# Patient Record
Sex: FEMALE | Race: WHITE | NOT HISPANIC OR LATINO | Employment: FULL TIME
[De-identification: names, ages, dates, MRNs, and addresses within clinical notes are randomized per-mention and may not be internally consistent; named-entity substitution may affect disease eponyms.]

---

## 2017-08-05 ENCOUNTER — HEALTH MAINTENANCE LETTER (OUTPATIENT)
Age: 27
End: 2017-08-05

## 2018-01-26 ENCOUNTER — OFFICE VISIT (OUTPATIENT)
Dept: FAMILY MEDICINE | Facility: CLINIC | Age: 28
End: 2018-01-26
Payer: COMMERCIAL

## 2018-01-26 VITALS
HEART RATE: 72 BPM | BODY MASS INDEX: 29.54 KG/M2 | WEIGHT: 166.7 LBS | SYSTOLIC BLOOD PRESSURE: 123 MMHG | DIASTOLIC BLOOD PRESSURE: 76 MMHG | OXYGEN SATURATION: 96 % | HEIGHT: 63 IN | TEMPERATURE: 98.3 F

## 2018-01-26 DIAGNOSIS — Z23 NEED FOR VACCINATION: ICD-10-CM

## 2018-01-26 DIAGNOSIS — E66.3 OVER WEIGHT: ICD-10-CM

## 2018-01-26 DIAGNOSIS — J45.20 ASTHMA, WELL CONTROLLED, MILD INTERMITTENT: ICD-10-CM

## 2018-01-26 DIAGNOSIS — Z11.3 SCREEN FOR STD (SEXUALLY TRANSMITTED DISEASE): ICD-10-CM

## 2018-01-26 DIAGNOSIS — Z12.4 SCREENING FOR CERVICAL CANCER: ICD-10-CM

## 2018-01-26 DIAGNOSIS — Z97.5 IUD (INTRAUTERINE DEVICE) IN PLACE: ICD-10-CM

## 2018-01-26 DIAGNOSIS — Z00.00 ROUTINE GENERAL MEDICAL EXAMINATION AT A HEALTH CARE FACILITY: Primary | ICD-10-CM

## 2018-01-26 PROBLEM — J45.30 MILD PERSISTENT ASTHMA, WELL CONTROLLED: Status: ACTIVE | Noted: 2018-01-26

## 2018-01-26 PROCEDURE — 87491 CHLMYD TRACH DNA AMP PROBE: CPT | Performed by: FAMILY MEDICINE

## 2018-01-26 PROCEDURE — 99385 PREV VISIT NEW AGE 18-39: CPT | Mod: 25 | Performed by: FAMILY MEDICINE

## 2018-01-26 PROCEDURE — 87389 HIV-1 AG W/HIV-1&-2 AB AG IA: CPT | Performed by: FAMILY MEDICINE

## 2018-01-26 PROCEDURE — 86780 TREPONEMA PALLIDUM: CPT | Performed by: FAMILY MEDICINE

## 2018-01-26 PROCEDURE — 90471 IMMUNIZATION ADMIN: CPT | Performed by: FAMILY MEDICINE

## 2018-01-26 PROCEDURE — G0145 SCR C/V CYTO,THINLAYER,RESCR: HCPCS | Performed by: FAMILY MEDICINE

## 2018-01-26 PROCEDURE — 36415 COLL VENOUS BLD VENIPUNCTURE: CPT | Performed by: FAMILY MEDICINE

## 2018-01-26 PROCEDURE — 90686 IIV4 VACC NO PRSV 0.5 ML IM: CPT | Performed by: FAMILY MEDICINE

## 2018-01-26 PROCEDURE — 87591 N.GONORRHOEAE DNA AMP PROB: CPT | Performed by: FAMILY MEDICINE

## 2018-01-26 RX ORDER — CETIRIZINE HYDROCHLORIDE 5 MG/1
TABLET, CHEWABLE ORAL
COMMUNITY
Start: 2010-05-08 | End: 2018-01-26

## 2018-01-26 RX ORDER — SPIRONOLACTONE 25 MG/1
25 TABLET ORAL
COMMUNITY
End: 2023-07-09

## 2018-01-26 RX ORDER — CETIRIZINE HYDROCHLORIDE 10 MG/1
10 TABLET ORAL
COMMUNITY
Start: 2015-12-22 | End: 2023-07-09

## 2018-01-26 RX ORDER — ALBUTEROL SULFATE 90 UG/1
1-2 AEROSOL, METERED RESPIRATORY (INHALATION) EVERY 6 HOURS PRN
COMMUNITY
Start: 2010-05-08

## 2018-01-26 NOTE — LETTER
My Asthma Action Plan  Name: Regina Lawson   YOB: 1990  Date: 1/26/2018   My doctor: Opal Gardner MD   My clinic: [facility]        My Control Medicine: None  My Rescue Medicine: Albuterol (Proair/Ventolin/Proventil) inhaler as needed   My Asthma Severity: intermittent  Avoid your asthma triggers: pollens, animal dander, humidity, exercise or sports and cold air               GREEN ZONE   Good Control    I feel good    No cough or wheeze    Can work, sleep and play without asthma symptoms       Take your asthma control medicine every day.     1. If exercise triggers your asthma, take your rescue medication    15 minutes before exercise or sports, and    During exercise if you have asthma symptoms  2. Spacer to use with inhaler: If you have a spacer, make sure to use it with your inhaler             YELLOW ZONE Getting Worse  I have ANY of these:    I do not feel good    Cough or wheeze    Chest feels tight    Wake up at night   1. Keep taking your Green Zone medications  2. Start taking your rescue medicine:    every 20 minutes for up to 1 hour. Then every 4 hours for 24-48 hours.  3. If you stay in the Yellow Zone for more than 12-24 hours, contact your doctor.  4. If you do not return to the Green Zone in 12-24 hours or you get worse, start taking your oral steroid medicine if prescribed by your provider.           RED ZONE Medical Alert - Get Help  I have ANY of these:    I feel awful    Medicine is not helping    Breathing getting harder    Trouble walking or talking    Nose opens wide to breathe       1. Take your rescue medicine NOW  2. If your provider has prescribed an oral steroid medicine, start taking it NOW  3. Call your doctor NOW  4. If you are still in the Red Zone after 20 minutes and you have not reached your doctor:    Take your rescue medicine again and    Call 911 or go to the emergency room right away    See your regular doctor within 2 weeks of an  Emergency Room or Urgent Care visit for follow-up treatment.        Electronically signed by: Opal Gardner, January 26, 2018    Annual Reminders:  Meet with Asthma Educator,  Flu Shot in the Fall, consider Pneumonia Vaccination for patients with asthma (aged 19 and older).    Pharmacy:    [id number] - [place], [place] - [address] AT [facility] [address] [place] [address]  [facility] [place] - [address] [facility]  [id number] [place], [place] - [address] [facility]                    Asthma Triggers  How To Control Things That Make Your Asthma Worse    Triggers are things that make your asthma worse.  Look at the list below to help you find your triggers and what you can do about them.  You can help prevent asthma flare-ups by staying away from your triggers.      Trigger                                                          What you can do   Cigarette Smoke  Tobacco smoke can make asthma worse. Do not allow smoking in your home, car or around you.  Be sure no one smokes at a child s day care or school.  If you smoke, ask your health care provider for ways to help you quit.  Ask family members to quit too.  Ask your health care provider for a referral to Quit Plan to help you quit smoking, or call 5-853-649-PLAN.     Colds, Flu, Bronchitis  These are common triggers of asthma. Wash your hands often.  Don t touch your eyes, nose or mouth.  Get a flu shot every year.     Dust Mites  These are tiny bugs that live in cloth or carpet. They are too small to see. Wash sheets and blankets in hot water every week.   Encase pillows and mattress in dust mite proof covers.  Avoid having carpet if you can. If you have carpet, vacuum weekly.   Use a dust mask and HEPA vacuum.   Pollen and Outdoor Mold  Some people are allergic to trees, grass, or weed pollen, or molds. Try to keep your windows closed.  Limit time out doors when  pollen count is high.   Ask you health care provider about taking medicine during allergy season.     Animal Dander  Some people are allergic to skin flakes, urine or saliva from pets with fur or feathers. Keep pets with fur or feathers out of your home.    If you can t keep the pet outdoors, then keep the pet out of your bedroom.  Keep the bedroom door closed.  Keep pets off cloth furniture and away from stuffed toys.     Mice, Rats, and Cockroaches  Some people are allergic to the waste from these pests.   Cover food and garbage.  Clean up spills and food crumbs.  Store grease in the refrigerator.   Keep food out of the bedroom.   Indoor Mold  This can be a trigger if your home has high moisture. Fix leaking faucets, pipes, or other sources of water.   Clean moldy surfaces.  Dehumidify basement if it is damp and smelly.   Smoke, Strong Odors, and Sprays  These can reduce air quality. Stay away from strong odors and sprays, such as perfume, powder, hair spray, paints, smoke incense, paint, cleaning products, candles and new carpet.   Exercise or Sports  Some people with asthma have this trigger. Be active!  Ask your doctor about taking medicine before sports or exercise to prevent symptoms.    Warm up for 5-10 minutes before and after sports or exercise.     Other Triggers of Asthma  Cold air:  Cover your nose and mouth with a scarf.  Sometimes laughing or crying can be a trigger.  Some medicines and food can trigger asthma.

## 2018-01-26 NOTE — NURSING NOTE
"Chief Complaint   Patient presents with     Physical       Initial /76  Pulse 72  Temp 98.3  F (36.8  C) (Oral)  Ht 5' 3\" (1.6 m)  Wt 166 lb 11.2 oz (75.6 kg)  LMP 12/28/2017 (Approximate)  SpO2 96%  BMI 29.53 kg/m2 Estimated body mass index is 29.53 kg/(m^2) as calculated from the following:    Height as of this encounter: 5' 3\" (1.6 m).    Weight as of this encounter: 166 lb 11.2 oz (75.6 kg).  Medication Reconciliation: complete      Health Maintenance that is potentially due pending provider review:  Pap Smear    Possibly completing today per provider review.    DARREN Beck  "

## 2018-01-26 NOTE — PATIENT INSTRUCTIONS

## 2018-01-26 NOTE — PROGRESS NOTES
SUBJECTIVE:   CC: Regina Lawson is an 27 year old woman who presents for preventive health visit.   History of excercise induced asthma  Albuterol helps as needed   Also has allergies- all yr round  Over the counter Cetirizine helps  Does not need refill on any medications  ACT Total Scores 1/26/2018   ACT TOTAL SCORE (Goal Greater than or Equal to 20) 25   In the past 12 months, how many times did you visit the emergency room for your asthma without being admitted to the hospital? 0   In the past 12 months, how many times were you hospitalized overnight because of your asthma? 0         She is interested in screening for Diabetes   Stressful Sedentary work at  [employer] job  Stress eat  Weight gain- secondary to that & also fracture arm this past summer July 2017    Physical   Annual:     Getting at least 3 servings of Calcium per day::  Yes    Bi-annual eye exam::  Yes    Dental care twice a year::  NO    Sleep apnea or symptoms of sleep apnea::  None    Diet::  Regular (no restrictions)    Frequency of exercise::  2-3 days/week    Duration of exercise::  15-30 minutes    Taking medications regularly::  Yes    Medication side effects::  Other    Additional concerns today::  YES                PROBLEMS TO ADD ON...    Today's PHQ-2 Score:   PHQ-2 ( 1999 Pfizer) 1/26/2018   Q1: Little interest or pleasure in doing things 1   Q2: Feeling down, depressed or hopeless 1   PHQ-2 Score 2   Q1: Little interest or pleasure in doing things Several days   Q2: Feeling down, depressed or hopeless Several days   PHQ-2 Score 2       Abuse: Current or Past(Physical, Sexual or Emotional)- No  Do you feel safe in your environment - Yes    Social History   Substance Use Topics     Smoking status: Never Smoker     Smokeless tobacco: Never Used     Alcohol use No     Alcohol Use 1/26/2018   If you drink alcohol, do you typically have greater than 3 drinks per day OR greater than 7 drinks per week?   No       Reviewed orders with  "patient.  Reviewed health maintenance and updated orders accordingly - Yes  Labs reviewed in EPIC    Mammogram not appropriate for this patient based on age.    Pertinent mammograms are reviewed under the imaging tab.  History of abnormal Pap smear: NO - age 21-29 PAP every 3 years recommended    Reviewed and updated as needed this visit by clinical staff  Tobacco  Allergies  Meds         Reviewed and updated as needed this visit by Provider            Review of Systems  C: NEGATIVE for fever, chills, change in weight  I: NEGATIVE for worrisome rashes, moles or lesions  E: NEGATIVE for vision changes or irritation  ENT: NEGATIVE for ear, mouth and throat problems  R: NEGATIVE for significant cough or SOB  B: NEGATIVE for masses, tenderness or discharge  CV: NEGATIVE for chest pain, palpitations or peripheral edema  GI: NEGATIVE for nausea, abdominal pain, heartburn, or change in bowel habits  : NEGATIVE for unusual urinary or vaginal symptoms. Periods are regular.  M: NEGATIVE for significant arthralgias or myalgia  N: NEGATIVE for weakness, dizziness or paresthesias  P: NEGATIVE for changes in mood or affect     OBJECTIVE:   /76  Pulse 72  Temp 98.3  F (36.8  C) (Oral)  Ht 5' 3\" (1.6 m)  Wt 166 lb 11.2 oz (75.6 kg)  LMP 12/28/2017 (Approximate)  SpO2 96%  BMI 29.53 kg/m2  Physical Exam  GENERAL: healthy, alert and no distress  EYES: Eyes grossly normal to inspection, PERRL and conjunctivae and sclerae normal  HENT: ear canals and TM's normal, nose and mouth without ulcers or lesions  NECK: no adenopathy, no asymmetry, masses, or scars and thyroid normal to palpation  RESP: lungs clear to auscultation - no rales, rhonchi or wheezes  BREAST: normal without masses, tenderness or nipple discharge and no palpable axillary masses or adenopathy  CV: regular rate and rhythm, normal S1 S2, no S3 or S4, no murmur, click or rub, no peripheral edema and peripheral pulses strong  ABDOMEN: soft, nontender, no " "hepatosplenomegaly, no masses and bowel sounds normal   (female): normal female external genitalia, normal urethral meatus, vaginal mucosa pink, moist, well rugated, and normal cervix/adnexa/uterus without masses or discharge  MS: no gross musculoskeletal defects noted, no edema  SKIN: no suspicious lesions or rashes  NEURO: Normal strength and tone, mentation intact and speech normal  PSYCH: mentation appears normal, affect normal/bright    ASSESSMENT/PLAN:   (Z00.00) Routine general medical examination at a health care facility  (primary encounter diagnosis)  Comment: Plan: Please see patient instructions     (J45.30) Mild intermitent asthma, well controlled  Comment: Plan: proair as needed  . Will call if needs refill    (E66.3) Over weight  Comment: Plan: enocuarged portion and calorie control  Adding fresh veggies to the diet  Has plans for weekly meal planning at home  Has noted already some intentional weight loss     (Z12.4) Screening for cervical cancer  Comment: tami: Pap imaged thin layer screen reflex to HPV if         ASCUS - recommend age 25 - 29          (Z97.5) IUD (intrauterine device) in place  Comment: Jasmin-- need removal 12/31/2018  Plan:     (Z23) Need for vaccination  Comment: Plan: HC FLU VAC PRESRV FREE QUAD SPLIT VIR 3+YRS IM          (Z11.3) Screen for STD (sexually transmitted disease)  Comment: Plan: Chlamydia trachomatis PCR, Neisseria         gonorrhoeae PCR              COUNSELING:  Reviewed preventive health counseling, as reflected in patient instructions       Regular exercise       Healthy diet/nutrition         reports that she has never smoked. She has never used smokeless tobacco.    Estimated body mass index is 29.53 kg/(m^2) as calculated from the following:    Height as of this encounter: 5' 3\" (1.6 m).    Weight as of this encounter: 166 lb 11.2 oz (75.6 kg).       Counseling Resources:  ATP IV Guidelines  Pooled Cohorts Equation Calculator  Breast Cancer Risk " Calculator  FRAX Risk Assessment  ICSI Preventive Guidelines  Dietary Guidelines for Americans, 2010  USDA's MyPlate  ASA Prophylaxis  Lung CA Screening    Opal Gardner MD  [facility]  Answers for HPI/ROS submitted by the patient on 1/26/2018   PHQ-2 Score: 2

## 2018-01-26 NOTE — LETTER
February 2, 2018      Regina Renny  [address] [facility] [address]    Dear ,      I am happy to inform you that your recent cervical cancer screening test (PAP smear) was normal.      Preventative screenings such as this help to ensure your health for years to come. You should repeat a pap smear in 3 years, unless otherwise directed.      You will still need to return to the clinic every year for your annual exam and other preventive tests.     Please contact the clinic at [phone] if you have further questions.       Sincerely,      Opal Gardner MD/[facility]

## 2018-01-26 NOTE — MR AVS SNAPSHOT
After Visit Summary   1/26/2018    Regina Lawson    MRN: [id number]           Patient Information     Date Of Birth          1990        Visit Information        Provider Department      1/26/2018 12:00 PM Opal Gardner MD [facility]        Today's Diagnoses     Routine general medical examination at a health care facility    -  1    Mild persistent asthma, well controlled        Over weight        Screening for cervical cancer        IUD (intrauterine device) in place        Need for vaccination        Screen for STD (sexually transmitted disease)          Care Instructions      Preventive Health Recommendations  Female Ages 26 - 39  Yearly exam:   See your health care provider every year in order to    Review health changes.     Discuss preventive care.      Review your medicines if you your doctor has prescribed any.    Until age 30: Get a Pap test every three years (more often if you have had an abnormal result).    After age 30: Talk to your doctor about whether you should have a Pap test every 3 years or have a Pap test with HPV screening every 5 years.   You do not need a Pap test if your uterus was removed (hysterectomy) and you have not had cancer.  You should be tested each year for STDs (sexually transmitted diseases), if you're at risk.   Talk to your provider about how often to have your cholesterol checked.  If you are at risk for diabetes, you should have a diabetes test (fasting glucose).  Shots: Get a flu shot each year. Get a tetanus shot every 10 years.   Nutrition:     Eat at least 5 servings of fruits and vegetables each day.    Eat whole-grain bread, whole-wheat pasta and brown rice instead of white grains and rice.    Talk to your provider about Calcium and Vitamin D.     Lifestyle    Exercise at least 150 minutes a week (30 minutes a day, 5 days of the week). This will help you control your weight and prevent disease.    Limit alcohol to one drink  per day.    No smoking.     Wear sunscreen to prevent skin cancer.    See your dentist every six months for an exam and cleaning.      Preventive Health Recommendations  Female Ages 26 - 39  Yearly exam:   See your health care provider every year in order to    Review health changes.     Discuss preventive care.      Review your medicines if you your doctor has prescribed any.    Until age 30: Get a Pap test every three years (more often if you have had an abnormal result).    After age 30: Talk to your doctor about whether you should have a Pap test every 3 years or have a Pap test with HPV screening every 5 years.   You do not need a Pap test if your uterus was removed (hysterectomy) and you have not had cancer.  You should be tested each year for STDs (sexually transmitted diseases), if you're at risk.   Talk to your provider about how often to have your cholesterol checked.  If you are at risk for diabetes, you should have a diabetes test (fasting glucose).  Shots: Get a flu shot each year. Get a tetanus shot every 10 years.   Nutrition:     Eat at least 5 servings of fruits and vegetables each day.    Eat whole-grain bread, whole-wheat pasta and brown rice instead of white grains and rice.    Talk to your provider about Calcium and Vitamin D.     Lifestyle    Exercise at least 150 minutes a week (30 minutes a day, 5 days of the week). This will help you control your weight and prevent disease.    Limit alcohol to one drink per day.    No smoking.     Wear sunscreen to prevent skin cancer.    See your dentist every six months for an exam and cleaning.            Follow-ups after your visit        Who to contact     If you have questions or need follow up information about today's clinic visit or your schedule please contact [facility] directly at [phone].  Normal or non-critical lab and imaging results will be communicated to you by MyChart, letter or phone within 4 business days after the  "clinic has received the results. If you do not hear from us within 7 days, please contact the clinic through [employer] or phone. If you have a critical or abnormal lab result, we will notify you by phone as soon as possible.  Submit refill requests through [employer] or call your pharmacy and they will forward the refill request to us. Please allow 3 business days for your refill to be completed.          Additional Information About Your Visit        [employer] Information     [employer] lets you send messages to your doctor, view your test results, renew your prescriptions, schedule appointments and more. To sign up, go to [email or web address] . Click on \"Log in\" on the left side of the screen, which will take you to the Welcome page. Then click on \"Sign up Now\" on the right side of the page.     You will be asked to enter the access code listed below, as well as some personal information. Please follow the directions to create your username and password.     Your access code is: [email or web address]  Expires: 2018 12:52 PM     Your access code will  in 90 days. If you need help or a new code, please call your [facility] clinic or [phone].        Care EveryWhere ID     This is your Care EveryWhere ID. This could be used by other organizations to access your [facility] medical records  [id number]        Your Vitals Were     Pulse Temperature Height Last Period Pulse Oximetry BMI (Body Mass Index)    72 98.3  F (36.8  C) (Oral) 5' 3\" (1.6 m) 2017 (Approximate) 96% 29.53 kg/m2       Blood Pressure from Last 3 Encounters:   18 123/76   14 112/75   11 104/65    Weight from Last 3 Encounters:   18 166 lb 11.2 oz (75.6 kg)   14 160 lb (72.6 kg)   11 147 lb 6.4 oz (66.9 kg)              We Performed the Following     Anti Treponema     Chlamydia trachomatis PCR     HC FLU VAC PRESRV FREE QUAD SPLIT VIR 3+YRS IM     HIV Antigen Antibody Combo     Neisseria gonorrhoeae PCR     Pap " imaged thin layer screen reflex to HPV if ASCUS - recommend age 25 - 29        Primary Care Provider Office Phone # Fax #    Opal Gardner [phone] [address] [facility] [address]        Equal Access to Services     DANIEL ECHOLS : Hadii radha goldman hadrondao Soomaali, waaxda luqadaha, qaybta kaalmada adeegyada, liz ford rebeccabeck fayesedamilton sebastian. So [facility] [phone].    ATENCIÓN: Si habla español, tiene a jacobson disposición servicios gratuitos de asistencia lingüística. Llame al [phone].    We comply with applicable federal civil rights laws and [place] laws. We do not discriminate on the basis of race, color, national origin, age, disability, sex, sexual orientation, or gender identity.            Thank you!     Thank you for choosing [facility]  for your care. Our goal is always to provide you with excellent care. Hearing back from our patients is one way we can continue to improve our services. Please take a few minutes to complete the written survey that you may receive in the mail after your visit with us. Thank you!             Your Updated Medication List - Protect others around you: Learn how to safely use, store and throw away your medicines at www.disposemymeds.org.          This list is accurate as of 1/26/18 12:52 PM.  Always use your most recent med list.                   Brand Name Dispense Instructions for use Diagnosis    albuterol 108 (90 BASE) MCG/ACT Inhaler    PROAIR HFA/PROVENTIL HFA/VENTOLIN HFA     1-2 puffs        cetirizine 10 MG tablet    zyrTEC     Take 10 mg by mouth        CLARITIN 10 MG tablet   Generic drug:  loratadine     30 tablet    Take 1 tablet (10 mg) by mouth daily    Perennial allergic rhinitis       etonogestrel-ethinyl estradiol 0.12-0.015 MG/24HR vaginal ring    NUVARING    3 each    Place 1 ring every 21 days then remove for 1 week.    Contraception       Levonorgestrel 13.5 MG Iud      1 Device by Intrauterine  route        norgestimate-ethinyl estradiol 0.25-35 MG-MCG per tablet    ORTHO-CYCLEN, SPRINTEC    3 Package    Take 1 tablet by mouth daily    Other acne, Contraception       spironolactone 25 MG tablet    ALDACTONE     Take 25 mg by mouth

## 2018-01-27 LAB — T PALLIDUM IGG+IGM SER QL: NEGATIVE

## 2018-01-27 ASSESSMENT — ASTHMA QUESTIONNAIRES: ACT_TOTALSCORE: 25

## 2018-01-28 PROBLEM — J45.20 ASTHMA, WELL CONTROLLED, MILD INTERMITTENT: Status: ACTIVE | Noted: 2018-01-26

## 2018-01-28 LAB
C TRACH DNA SPEC QL NAA+PROBE: NEGATIVE
N GONORRHOEA DNA SPEC QL NAA+PROBE: NEGATIVE
SPECIMEN SOURCE: NORMAL
SPECIMEN SOURCE: NORMAL

## 2018-01-29 LAB — HIV 1+2 AB+HIV1 P24 AG SERPL QL IA: NONREACTIVE

## 2018-01-29 NOTE — PROGRESS NOTES
Send lab & letter    All labs normal  Negative for chlamydia & gonorrhea , HIV, human immunodeficiency virus  and syphilis      Please keep us posted with questions or concerns .      Best Regards,    Opal Gardner MD  [facility]  [phone]

## 2018-01-30 LAB
COPATH REPORT: NORMAL
PAP: NORMAL

## 2020-07-31 ENCOUNTER — VIRTUAL VISIT (OUTPATIENT)
Dept: FAMILY MEDICINE | Facility: OTHER | Age: 30
End: 2020-07-31

## 2020-07-31 NOTE — PROGRESS NOTES
"Date: 2020 12:25:17  Clinician: Alison Frias  Clinician NPI: [id number]  Patient: Regina Lawson  Patient : 1990  Patient Address: [address] [place] [address]  Patient Phone: [phone]  Visit Protocol: URI  Patient Summary:  Regina is a 30 year old ( : 1990 ) female who initiated a Visit for COVID-19 (Coronavirus) evaluation and screening. When asked the question \"Please sign me up to receive news, health information and promotions. \", Regina responded \"No\".    When asked when her symptoms started, Regina reported that she does not have any symptoms.   She denies having recent facial or sinus surgery in the past 60 days and taking antibiotic medication in the past month.    Pertinent COVID-19 (Coronavirus) information  In the past 14 days, Regina has not worked in a congregate living setting.   She does not work or volunteer as healthcare worker or a  and does not work or volunteer in a healthcare facility.   Regina also has not lived in a congregate living setting in the past 14 days. She does not live with a healthcare worker.   Regina has not had a close contact with a laboratory-confirmed COVID-19 patient within the last 14 days.   Pertinent medical history  Regina does not get yeast infections when she takes antibiotics.   Regina does not need a return to work/school note.   Weight: 160 lbs   Regina does not smoke or use smokeless tobacco.   She denies pregnancy and denies breastfeeding. She has menstruated in the past month.   Weight: 160 lbs    MEDICATIONS: Jasmin intrauterine, ALLERGIES: NKDA  Clinician Response:  Dear Regina,   Based on the details you've shared, you do not need to be tested at this time.  What should I do?  Cover your mouth and nose with a mask, tissue or washcloth to avoid spreading germs.  Wash your hands and face often. Use soap and water.  What are the symptoms of COVID-19?  The most common symptoms " are cough, fever and trouble breathing. Less common symptoms include headache, body aches, fatigue (feeling very tired), chills, sore throat, stuffy or runny nose, diarrhea (loose poop), loss of taste or smell, belly pain, and nausea or vomiting (feeling sick to your stomach or throwing up).  If you develop symptoms, follow these guidelines.  If you're normally healthy: Please start another [facility] visit to report your symptoms. Go to [email or web address].  If you have a serious health problem (like cancer, heart failure, an organ transplant or kidney disease): Call your specialty clinic. Let them know that you might have COVID-19.  Where can I get more information?    [facility] [facility] -- About COVID-19: [email or web address]  CDC -- What to Do If You're Sick: www.cdc.gov/coronavirus/2019-ncov/about/steps-when-sick.html  [facility] -- Ending Home Isolation: www.cdc.gov/coronavirus/2019-ncov/hcp/disposition-in-home-patients.html  CDC -- Caring for Someone: www.cdc.gov/coronavirus/2019-ncov/if-you-are-sick/care-for-someone.html  [facility] -- Interim Guidance for Hospital Discharge to Home: www.health.[facility].[place]/diseases/coronavirus/hcp/hospdischarge.pdf  [facility] clinical trials (COVID-19 research studies): clinicalaffairs.[facility]-clinical-trials  Below are the COVID-19 hotlines at the [facility] of Health ([facility]). Interpreters are available.   For health questions: Call 935-918-2721 or 1-753.441.7378 (7 a.m. to 7 p.m.) For questions about schools and childcare: Call 204-279-7458 or 1-340.693.6212 (7 a.m. to 7 p.m.)     .      Diagnosis: Worries  Diagnosis ICD: R45.82

## 2023-07-08 ENCOUNTER — APPOINTMENT (OUTPATIENT)
Dept: CT IMAGING | Facility: CLINIC | Age: 33
DRG: 076 | End: 2023-07-08
Attending: EMERGENCY MEDICINE
Payer: COMMERCIAL

## 2023-07-08 ENCOUNTER — HOSPITAL ENCOUNTER (INPATIENT)
Facility: CLINIC | Age: 33
LOS: 2 days | Discharge: HOME | DRG: 076 | End: 2023-07-11
Attending: EMERGENCY MEDICINE | Admitting: INTERNAL MEDICINE
Payer: COMMERCIAL

## 2023-07-08 DIAGNOSIS — G03.0 ASEPTIC MENINGITIS: Primary | ICD-10-CM

## 2023-07-08 DIAGNOSIS — R11.2 NAUSEA AND VOMITING, UNSPECIFIED VOMITING TYPE: ICD-10-CM

## 2023-07-08 DIAGNOSIS — H46.9 OPTIC NEURITIS, LEFT: ICD-10-CM

## 2023-07-08 DIAGNOSIS — R51.9 NONINTRACTABLE HEADACHE, UNSPECIFIED CHRONICITY PATTERN, UNSPECIFIED HEADACHE TYPE: ICD-10-CM

## 2023-07-08 LAB
ANION GAP SERPL CALCULATED.3IONS-SCNC: 13 MMOL/L (ref 7–15)
BUN SERPL-MCNC: 6.1 MG/DL (ref 6–20)
CALCIUM SERPL-MCNC: 8.9 MG/DL (ref 8.6–10)
CHLORIDE SERPL-SCNC: 104 MMOL/L (ref 98–107)
CREAT SERPL-MCNC: 0.72 MG/DL (ref 0.51–0.95)
DEPRECATED HCO3 PLAS-SCNC: 22 MMOL/L (ref 22–29)
ERYTHROCYTE [DISTWIDTH] IN BLOOD BY AUTOMATED COUNT: 12.3 % (ref 10–15)
GFR SERPL CREATININE-BSD FRML MDRD: >90 ML/MIN/1.73M2
GLUCOSE SERPL-MCNC: 131 MG/DL (ref 70–99)
HCG SERPL QL: NEGATIVE
HCT VFR BLD AUTO: 40.6 % (ref 35–47)
HGB BLD-MCNC: 13.7 G/DL (ref 11.7–15.7)
MCH RBC QN AUTO: 30.2 PG (ref 26.5–33)
MCHC RBC AUTO-ENTMCNC: 33.7 G/DL (ref 31.5–36.5)
MCV RBC AUTO: 90 FL (ref 78–100)
PLATELET # BLD AUTO: 311 10E3/UL (ref 150–450)
POTASSIUM SERPL-SCNC: 3.8 MMOL/L (ref 3.4–5.3)
RBC # BLD AUTO: 4.53 10E6/UL (ref 3.8–5.2)
SARS-COV-2 RNA RESP QL NAA+PROBE: NEGATIVE
SODIUM SERPL-SCNC: 139 MMOL/L (ref 136–145)
WBC # BLD AUTO: 8.2 10E3/UL (ref 4–11)

## 2023-07-08 PROCEDURE — 36415 COLL VENOUS BLD VENIPUNCTURE: CPT | Performed by: EMERGENCY MEDICINE

## 2023-07-08 PROCEDURE — 96361 HYDRATE IV INFUSION ADD-ON: CPT

## 2023-07-08 PROCEDURE — 87635 SARS-COV-2 COVID-19 AMP PRB: CPT | Performed by: EMERGENCY MEDICINE

## 2023-07-08 PROCEDURE — 86140 C-REACTIVE PROTEIN: CPT | Performed by: PSYCHIATRY & NEUROLOGY

## 2023-07-08 PROCEDURE — 86038 ANTINUCLEAR ANTIBODIES: CPT | Performed by: PSYCHIATRY & NEUROLOGY

## 2023-07-08 PROCEDURE — 84703 CHORIONIC GONADOTROPIN ASSAY: CPT | Performed by: EMERGENCY MEDICINE

## 2023-07-08 PROCEDURE — 70498 CT ANGIOGRAPHY NECK: CPT

## 2023-07-08 PROCEDURE — 96374 THER/PROPH/DIAG INJ IV PUSH: CPT | Mod: 59

## 2023-07-08 PROCEDURE — 258N000003 HC RX IP 258 OP 636: Performed by: EMERGENCY MEDICINE

## 2023-07-08 PROCEDURE — 250N000011 HC RX IP 250 OP 636: Performed by: EMERGENCY MEDICINE

## 2023-07-08 PROCEDURE — 70496 CT ANGIOGRAPHY HEAD: CPT | Mod: XS

## 2023-07-08 PROCEDURE — 250N000011 HC RX IP 250 OP 636: Mod: JZ | Performed by: EMERGENCY MEDICINE

## 2023-07-08 PROCEDURE — 250N000009 HC RX 250: Performed by: EMERGENCY MEDICINE

## 2023-07-08 PROCEDURE — 70450 CT HEAD/BRAIN W/O DYE: CPT

## 2023-07-08 PROCEDURE — 80048 BASIC METABOLIC PNL TOTAL CA: CPT | Performed by: EMERGENCY MEDICINE

## 2023-07-08 PROCEDURE — 99285 EMERGENCY DEPT VISIT HI MDM: CPT | Mod: 25

## 2023-07-08 PROCEDURE — 009U3ZX DRAINAGE OF SPINAL CANAL, PERCUTANEOUS APPROACH, DIAGNOSTIC: ICD-10-PCS | Performed by: RADIOLOGY

## 2023-07-08 PROCEDURE — 96375 TX/PRO/DX INJ NEW DRUG ADDON: CPT

## 2023-07-08 PROCEDURE — 85027 COMPLETE CBC AUTOMATED: CPT | Performed by: EMERGENCY MEDICINE

## 2023-07-08 PROCEDURE — 70496 CT ANGIOGRAPHY HEAD: CPT

## 2023-07-08 RX ORDER — ONDANSETRON 2 MG/ML
4 INJECTION INTRAMUSCULAR; INTRAVENOUS ONCE
Status: COMPLETED | OUTPATIENT
Start: 2023-07-08 | End: 2023-07-08

## 2023-07-08 RX ORDER — DIPHENHYDRAMINE HYDROCHLORIDE 50 MG/ML
25 INJECTION INTRAMUSCULAR; INTRAVENOUS ONCE
Status: COMPLETED | OUTPATIENT
Start: 2023-07-08 | End: 2023-07-08

## 2023-07-08 RX ORDER — IOPAMIDOL 755 MG/ML
75 INJECTION, SOLUTION INTRAVASCULAR ONCE
Status: COMPLETED | OUTPATIENT
Start: 2023-07-08 | End: 2023-07-08

## 2023-07-08 RX ADMIN — ONDANSETRON 4 MG: 2 INJECTION INTRAMUSCULAR; INTRAVENOUS at 22:42

## 2023-07-08 RX ADMIN — SODIUM CHLORIDE 1000 ML: 9 INJECTION, SOLUTION INTRAVENOUS at 22:42

## 2023-07-08 RX ADMIN — DIPHENHYDRAMINE HYDROCHLORIDE 25 MG: 50 INJECTION, SOLUTION INTRAMUSCULAR; INTRAVENOUS at 23:02

## 2023-07-08 RX ADMIN — IOPAMIDOL 75 ML: 755 INJECTION, SOLUTION INTRAVENOUS at 23:40

## 2023-07-08 RX ADMIN — PROCHLORPERAZINE EDISYLATE 10 MG: 5 INJECTION INTRAMUSCULAR; INTRAVENOUS at 23:02

## 2023-07-08 RX ADMIN — SODIUM CHLORIDE 100 ML: 9 INJECTION, SOLUTION INTRAVENOUS at 23:40

## 2023-07-08 ASSESSMENT — ACTIVITIES OF DAILY LIVING (ADL): ADLS_ACUITY_SCORE: 35

## 2023-07-09 ENCOUNTER — APPOINTMENT (OUTPATIENT)
Dept: GENERAL RADIOLOGY | Facility: CLINIC | Age: 33
DRG: 076 | End: 2023-07-09
Attending: PSYCHIATRY & NEUROLOGY
Payer: COMMERCIAL

## 2023-07-09 ENCOUNTER — APPOINTMENT (OUTPATIENT)
Dept: MRI IMAGING | Facility: CLINIC | Age: 33
DRG: 076 | End: 2023-07-09
Attending: EMERGENCY MEDICINE
Payer: COMMERCIAL

## 2023-07-09 PROBLEM — R11.2 NAUSEA AND VOMITING, UNSPECIFIED VOMITING TYPE: Status: ACTIVE | Noted: 2023-07-09

## 2023-07-09 PROBLEM — H46.9 OPTIC NEURITIS, LEFT: Status: ACTIVE | Noted: 2023-07-09

## 2023-07-09 PROBLEM — R51.9 NONINTRACTABLE HEADACHE, UNSPECIFIED CHRONICITY PATTERN, UNSPECIFIED HEADACHE TYPE: Status: ACTIVE | Noted: 2023-07-09

## 2023-07-09 LAB
APPEARANCE CSF: CLEAR
COLOR CSF: COLORLESS
CRP SERPL-MCNC: 47.02 MG/L
ERYTHROCYTE [SEDIMENTATION RATE] IN BLOOD BY WESTERGREN METHOD: 7 MM/HR (ref 0–20)
GLUCOSE CSF-MCNC: 56 MG/DL (ref 40–70)
LYMPH ABN NFR CSF MANUAL: 53 %
MONOS+MACROS NFR CSF MANUAL: 46 %
NEUTROPHILS NFR CSF MANUAL: 1 %
PERFORMING LABORATORY: NORMAL
PROT CSF-MCNC: 57.3 MG/DL (ref 15–45)
RBC # CSF MANUAL: 13 /UL (ref 0–2)
TEST NAME: NORMAL
TUBE # CSF: 4
WBC # CSF MANUAL: 124 /UL (ref 0–5)

## 2023-07-09 PROCEDURE — 82945 GLUCOSE OTHER FLUID: CPT | Performed by: PSYCHIATRY & NEUROLOGY

## 2023-07-09 PROCEDURE — 87529 HSV DNA AMP PROBE: CPT | Performed by: PSYCHIATRY & NEUROLOGY

## 2023-07-09 PROCEDURE — 120N000001 HC R&B MED SURG/OB

## 2023-07-09 PROCEDURE — 87070 CULTURE OTHR SPECIMN AEROBIC: CPT | Performed by: PSYCHIATRY & NEUROLOGY

## 2023-07-09 PROCEDURE — 99207 PR NO CHARGE LOS: CPT | Performed by: INTERNAL MEDICINE

## 2023-07-09 PROCEDURE — 89051 BODY FLUID CELL COUNT: CPT | Performed by: PSYCHIATRY & NEUROLOGY

## 2023-07-09 PROCEDURE — 250N000013 HC RX MED GY IP 250 OP 250 PS 637: Performed by: INTERNAL MEDICINE

## 2023-07-09 PROCEDURE — 70543 MRI ORBT/FAC/NCK W/O &W/DYE: CPT

## 2023-07-09 PROCEDURE — 86592 SYPHILIS TEST NON-TREP QUAL: CPT | Performed by: PSYCHIATRY & NEUROLOGY

## 2023-07-09 PROCEDURE — 82164 ANGIOTENSIN I ENZYME TEST: CPT | Performed by: PSYCHIATRY & NEUROLOGY

## 2023-07-09 PROCEDURE — 96375 TX/PRO/DX INJ NEW DRUG ADDON: CPT

## 2023-07-09 PROCEDURE — A9585 GADOBUTROL INJECTION: HCPCS | Performed by: EMERGENCY MEDICINE

## 2023-07-09 PROCEDURE — 62328 DX LMBR SPI PNXR W/FLUOR/CT: CPT

## 2023-07-09 PROCEDURE — 84157 ASSAY OF PROTEIN OTHER: CPT | Performed by: PSYCHIATRY & NEUROLOGY

## 2023-07-09 PROCEDURE — 85652 RBC SED RATE AUTOMATED: CPT | Performed by: PSYCHIATRY & NEUROLOGY

## 2023-07-09 PROCEDURE — 36415 COLL VENOUS BLD VENIPUNCTURE: CPT | Performed by: PSYCHIATRY & NEUROLOGY

## 2023-07-09 PROCEDURE — 258N000003 HC RX IP 258 OP 636: Performed by: INTERNAL MEDICINE

## 2023-07-09 PROCEDURE — 82784 ASSAY IGA/IGD/IGG/IGM EACH: CPT | Performed by: PSYCHIATRY & NEUROLOGY

## 2023-07-09 PROCEDURE — 250N000011 HC RX IP 250 OP 636: Mod: JZ | Performed by: EMERGENCY MEDICINE

## 2023-07-09 PROCEDURE — 86788 WEST NILE VIRUS AB IGM: CPT | Performed by: PSYCHIATRY & NEUROLOGY

## 2023-07-09 PROCEDURE — 250N000011 HC RX IP 250 OP 636: Mod: JZ | Performed by: INTERNAL MEDICINE

## 2023-07-09 PROCEDURE — 70553 MRI BRAIN STEM W/O & W/DYE: CPT

## 2023-07-09 PROCEDURE — 87483 CNS DNA AMP PROBE TYPE 12-25: CPT | Performed by: PSYCHIATRY & NEUROLOGY

## 2023-07-09 PROCEDURE — 87205 SMEAR GRAM STAIN: CPT | Performed by: PSYCHIATRY & NEUROLOGY

## 2023-07-09 PROCEDURE — 250N000009 HC RX 250: Performed by: PSYCHIATRY & NEUROLOGY

## 2023-07-09 PROCEDURE — 255N000002 HC RX 255 OP 636: Performed by: EMERGENCY MEDICINE

## 2023-07-09 PROCEDURE — 88108 CYTOPATH CONCENTRATE TECH: CPT | Mod: TC | Performed by: PSYCHIATRY & NEUROLOGY

## 2023-07-09 PROCEDURE — 86789 WEST NILE VIRUS ANTIBODY: CPT | Performed by: PSYCHIATRY & NEUROLOGY

## 2023-07-09 PROCEDURE — 96361 HYDRATE IV INFUSION ADD-ON: CPT

## 2023-07-09 PROCEDURE — 99222 1ST HOSP IP/OBS MODERATE 55: CPT | Mod: AI | Performed by: INTERNAL MEDICINE

## 2023-07-09 RX ORDER — HYDROMORPHONE HYDROCHLORIDE 1 MG/ML
0.5 INJECTION, SOLUTION INTRAMUSCULAR; INTRAVENOUS; SUBCUTANEOUS ONCE
Status: COMPLETED | OUTPATIENT
Start: 2023-07-09 | End: 2023-07-09

## 2023-07-09 RX ORDER — SODIUM CHLORIDE, SODIUM LACTATE, POTASSIUM CHLORIDE, CALCIUM CHLORIDE 600; 310; 30; 20 MG/100ML; MG/100ML; MG/100ML; MG/100ML
INJECTION, SOLUTION INTRAVENOUS CONTINUOUS
Status: DISCONTINUED | OUTPATIENT
Start: 2023-07-09 | End: 2023-07-11

## 2023-07-09 RX ORDER — HYDROMORPHONE HCL IN WATER/PF 6 MG/30 ML
0.4 PATIENT CONTROLLED ANALGESIA SYRINGE INTRAVENOUS
Status: DISCONTINUED | OUTPATIENT
Start: 2023-07-09 | End: 2023-07-11 | Stop reason: HOSPADM

## 2023-07-09 RX ORDER — ACETAMINOPHEN 325 MG/1
650 TABLET ORAL EVERY 6 HOURS PRN
Status: DISCONTINUED | OUTPATIENT
Start: 2023-07-09 | End: 2023-07-11 | Stop reason: HOSPADM

## 2023-07-09 RX ORDER — PROCHLORPERAZINE MALEATE 10 MG
10 TABLET ORAL EVERY 6 HOURS PRN
Status: DISCONTINUED | OUTPATIENT
Start: 2023-07-09 | End: 2023-07-11 | Stop reason: HOSPADM

## 2023-07-09 RX ORDER — PRENATAL VIT/IRON FUM/FOLIC AC 27MG-0.8MG
1 TABLET ORAL DAILY
COMMUNITY

## 2023-07-09 RX ORDER — LIDOCAINE HYDROCHLORIDE 10 MG/ML
5 INJECTION, SOLUTION EPIDURAL; INFILTRATION; INTRACAUDAL; PERINEURAL ONCE
Status: COMPLETED | OUTPATIENT
Start: 2023-07-09 | End: 2023-07-09

## 2023-07-09 RX ORDER — PROCHLORPERAZINE 25 MG
25 SUPPOSITORY, RECTAL RECTAL EVERY 12 HOURS PRN
Status: DISCONTINUED | OUTPATIENT
Start: 2023-07-09 | End: 2023-07-11 | Stop reason: HOSPADM

## 2023-07-09 RX ORDER — NALOXONE HYDROCHLORIDE 0.4 MG/ML
0.4 INJECTION, SOLUTION INTRAMUSCULAR; INTRAVENOUS; SUBCUTANEOUS
Status: DISCONTINUED | OUTPATIENT
Start: 2023-07-09 | End: 2023-07-11 | Stop reason: HOSPADM

## 2023-07-09 RX ORDER — ONDANSETRON 4 MG/1
4 TABLET, ORALLY DISINTEGRATING ORAL EVERY 8 HOURS PRN
Status: ON HOLD | COMMUNITY
End: 2023-07-11

## 2023-07-09 RX ORDER — NALOXONE HYDROCHLORIDE 0.4 MG/ML
0.2 INJECTION, SOLUTION INTRAMUSCULAR; INTRAVENOUS; SUBCUTANEOUS
Status: DISCONTINUED | OUTPATIENT
Start: 2023-07-09 | End: 2023-07-11 | Stop reason: HOSPADM

## 2023-07-09 RX ORDER — BISACODYL 10 MG
10 SUPPOSITORY, RECTAL RECTAL DAILY PRN
Status: DISCONTINUED | OUTPATIENT
Start: 2023-07-09 | End: 2023-07-11 | Stop reason: HOSPADM

## 2023-07-09 RX ORDER — GADOBUTROL 604.72 MG/ML
7 INJECTION INTRAVENOUS ONCE
Status: COMPLETED | OUTPATIENT
Start: 2023-07-09 | End: 2023-07-09

## 2023-07-09 RX ORDER — HYDROMORPHONE HYDROCHLORIDE 2 MG/1
2 TABLET ORAL EVERY 4 HOURS PRN
Status: DISCONTINUED | OUTPATIENT
Start: 2023-07-09 | End: 2023-07-11 | Stop reason: HOSPADM

## 2023-07-09 RX ORDER — POLYETHYLENE GLYCOL 3350 17 G/17G
17 POWDER, FOR SOLUTION ORAL DAILY PRN
Status: DISCONTINUED | OUTPATIENT
Start: 2023-07-09 | End: 2023-07-11 | Stop reason: HOSPADM

## 2023-07-09 RX ORDER — ACETAMINOPHEN 650 MG/1
650 SUPPOSITORY RECTAL EVERY 6 HOURS PRN
Status: DISCONTINUED | OUTPATIENT
Start: 2023-07-09 | End: 2023-07-11 | Stop reason: HOSPADM

## 2023-07-09 RX ORDER — SUMATRIPTAN 50 MG/1
50 TABLET, FILM COATED ORAL
Status: ON HOLD | COMMUNITY
End: 2023-07-11

## 2023-07-09 RX ORDER — HYDROMORPHONE HCL IN WATER/PF 6 MG/30 ML
0.2 PATIENT CONTROLLED ANALGESIA SYRINGE INTRAVENOUS
Status: DISCONTINUED | OUTPATIENT
Start: 2023-07-09 | End: 2023-07-11 | Stop reason: HOSPADM

## 2023-07-09 RX ORDER — ONDANSETRON 2 MG/ML
4 INJECTION INTRAMUSCULAR; INTRAVENOUS EVERY 6 HOURS PRN
Status: DISCONTINUED | OUTPATIENT
Start: 2023-07-09 | End: 2023-07-11 | Stop reason: HOSPADM

## 2023-07-09 RX ORDER — ONDANSETRON 4 MG/1
4 TABLET, ORALLY DISINTEGRATING ORAL EVERY 6 HOURS PRN
Status: DISCONTINUED | OUTPATIENT
Start: 2023-07-09 | End: 2023-07-11 | Stop reason: HOSPADM

## 2023-07-09 RX ORDER — LIDOCAINE 40 MG/G
CREAM TOPICAL
Status: DISCONTINUED | OUTPATIENT
Start: 2023-07-09 | End: 2023-07-11 | Stop reason: HOSPADM

## 2023-07-09 RX ORDER — KETOROLAC TROMETHAMINE 15 MG/ML
15 INJECTION, SOLUTION INTRAMUSCULAR; INTRAVENOUS ONCE
Status: COMPLETED | OUTPATIENT
Start: 2023-07-09 | End: 2023-07-09

## 2023-07-09 RX ADMIN — ONDANSETRON 4 MG: 2 INJECTION INTRAMUSCULAR; INTRAVENOUS at 19:22

## 2023-07-09 RX ADMIN — ONDANSETRON 4 MG: 2 INJECTION INTRAMUSCULAR; INTRAVENOUS at 06:50

## 2023-07-09 RX ADMIN — PROCHLORPERAZINE EDISYLATE 10 MG: 5 INJECTION INTRAMUSCULAR; INTRAVENOUS at 09:16

## 2023-07-09 RX ADMIN — LIDOCAINE HYDROCHLORIDE 5 ML: 10 INJECTION, SOLUTION EPIDURAL; INFILTRATION; INTRACAUDAL; PERINEURAL at 19:59

## 2023-07-09 RX ADMIN — HYDROMORPHONE HYDROCHLORIDE 0.4 MG: 0.2 INJECTION, SOLUTION INTRAMUSCULAR; INTRAVENOUS; SUBCUTANEOUS at 14:17

## 2023-07-09 RX ADMIN — HYDROMORPHONE HYDROCHLORIDE 0.4 MG: 0.2 INJECTION, SOLUTION INTRAMUSCULAR; INTRAVENOUS; SUBCUTANEOUS at 11:51

## 2023-07-09 RX ADMIN — HYDROMORPHONE HYDROCHLORIDE 0.4 MG: 0.2 INJECTION, SOLUTION INTRAMUSCULAR; INTRAVENOUS; SUBCUTANEOUS at 16:27

## 2023-07-09 RX ADMIN — HYDROMORPHONE HYDROCHLORIDE 0.4 MG: 0.2 INJECTION, SOLUTION INTRAMUSCULAR; INTRAVENOUS; SUBCUTANEOUS at 20:40

## 2023-07-09 RX ADMIN — ACETAMINOPHEN 650 MG: 325 TABLET, FILM COATED ORAL at 12:16

## 2023-07-09 RX ADMIN — HYDROMORPHONE HYDROCHLORIDE 0.4 MG: 0.2 INJECTION, SOLUTION INTRAMUSCULAR; INTRAVENOUS; SUBCUTANEOUS at 06:51

## 2023-07-09 RX ADMIN — GADOBUTROL 7 ML: 604.72 INJECTION INTRAVENOUS at 02:06

## 2023-07-09 RX ADMIN — SODIUM CHLORIDE, POTASSIUM CHLORIDE, SODIUM LACTATE AND CALCIUM CHLORIDE: 600; 310; 30; 20 INJECTION, SOLUTION INTRAVENOUS at 07:28

## 2023-07-09 RX ADMIN — HYDROMORPHONE HYDROCHLORIDE 0.4 MG: 0.2 INJECTION, SOLUTION INTRAMUSCULAR; INTRAVENOUS; SUBCUTANEOUS at 08:46

## 2023-07-09 RX ADMIN — KETOROLAC TROMETHAMINE 15 MG: 15 INJECTION, SOLUTION INTRAMUSCULAR; INTRAVENOUS at 01:11

## 2023-07-09 RX ADMIN — HYDROMORPHONE HYDROCHLORIDE 0.5 MG: 1 INJECTION, SOLUTION INTRAMUSCULAR; INTRAVENOUS; SUBCUTANEOUS at 04:21

## 2023-07-09 ASSESSMENT — ACTIVITIES OF DAILY LIVING (ADL)
TOILETING_ISSUES: NO
DOING_ERRANDS_INDEPENDENTLY_DIFFICULTY: NO
CONCENTRATING,_REMEMBERING_OR_MAKING_DECISIONS_DIFFICULTY: NO
ADLS_ACUITY_SCORE: 35
ADLS_ACUITY_SCORE: 18
ADLS_ACUITY_SCORE: 18
ADLS_ACUITY_SCORE: 35
DIFFICULTY_EATING/SWALLOWING: NO
ADLS_ACUITY_SCORE: 18
WEAR_GLASSES_OR_BLIND: NO
DRESSING/BATHING_DIFFICULTY: NO
CHANGE_IN_FUNCTIONAL_STATUS_SINCE_ONSET_OF_CURRENT_ILLNESS/INJURY: NO
WALKING_OR_CLIMBING_STAIRS_DIFFICULTY: NO
ADLS_ACUITY_SCORE: 18
ADLS_ACUITY_SCORE: 35
ADLS_ACUITY_SCORE: 18
ADLS_ACUITY_SCORE: 18
ADLS_ACUITY_SCORE: 35
ADLS_ACUITY_SCORE: 18
ADLS_ACUITY_SCORE: 18
FALL_HISTORY_WITHIN_LAST_SIX_MONTHS: NO

## 2023-07-09 NOTE — H&P
"[facility]    History and Physical - Hospitalist Service       Date of Admission:  7/8/2023    Assessment & Plan   Regina Lawson is a 33 year old female with no significant past medical history who presents with headache, nausea, vomiting. Admitted on 7/8/2023.     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 Anticipate greater than or equal to 2 midnights prior to discharge.      Entered: Varun Oro MD 07/09/2023, 5:52 AM     The patient's care was discussed with the ED provider, patient and patient's mother      Clinically Significant Risk Factors Present on Admission                       # Overweight: Estimated body mass index is 29.23 kg/m  as calculated from the following:    Height as of this encounter: 1.6 m (5' 3\").    Weight as of this encounter: 74.8 kg (165 lb).                 Varun Oro MD  [facility]    ______________________________________________________________________    Chief Complaint   Headache, nausea, vomiting    History of Present Illness   Regina Lawson is a 33 year old female who presents with the above chief complaint.    History is obtained from the patient, discussion with ED provider and review of medical record.  " The patient reports around noon on 7/7 she developed a bilateral headache radiating into her neck and face.  She had a virtual visit on 7/8 and was prescribed migraine medication.  Her headache progressively worsened and she developed nausea with associated vomiting prompting her to seek care.  She reports feeling generally weak, denies any focal numbness/tingling/weakness.  Denies any double vision or blurry vision.  She reports she has felt chilled.  She reports her 8-month-old daughter has been recently ill with a fever. Otherwise she has been in her usual state of health previously.     In the Emergency Department, the patient was seen by Dr. Arvizu, with whom I discussed the patient's presenting symptoms and emergency department course.  Initial vital signs were a temperature of 97.8F, HR 88, /83, RR 16, SpO2 97% on room air. Pertinent work-up as noted in A&P. The case was discussed with general neurology who recommended admission and LP with likely high dose steroids after. Hospitalists were contacted for admission to the hospital.     Past Medical History    I have reviewed this patient's medical history and updated it with pertinent information if needed.   Past Medical History:   Diagnosis Date     Acne      Contraception 2014    Nuva Ring     Irregular menstrual cycle 2007     Keratosis pilaris      arms     Meningitis, unspecified(322.9) age 4 months    hosp. 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  Past Surgical History:   Procedure Laterality Date     NO HISTORY OF SURGERY           Social History   I have reviewed this patient's social history and updated it with pertinent information if needed.  Social History     Tobacco Use     Smoking status: Never     Smokeless tobacco:  Never   Substance Use Topics     Alcohol use: No     Drug use: No     . Lives in [place]     Family History   I have reviewed this patient's family history and updated it with pertinent information if needed.   Family History   Problem Relation Age of Onset     Cancer - colorectal Paternal Grandfather      Prostate Cancer Paternal Grandfather      Allergies Sister      Allergies Sister      Allergies Mother      Gastrointestinal Disease Sister         ulcer     Gastrointestinal Disease Paternal Grandmother         polyps     Heart Disease Mother         heart M.     Lipids Mother      Osteoporosis Paternal Grandmother         Prior to Admission Medications  - Pending pharmacy reconciliation   Prior to Admission Medications   Prescriptions Last Dose Informant Patient Reported? Taking?   Levonorgestrel 13.5 MG IUD   Yes No   Si Device by Intrauterine route   albuterol (PROAIR HFA/PROVENTIL HFA/VENTOLIN HFA) 108 (90 BASE) MCG/ACT Inhaler   Yes No   Si-2 puffs   cetirizine (ZYRTEC) 10 MG tablet   Yes No   Sig: Take 10 mg by mouth   etonogestrel-ethinyl estradiol (NUVARING) 0.12-0.015 MG/24HR vaginal ring   No No   Sig: Place 1 ring every 21 days then remove for 1 week.   loratadine (CLARITIN) 10 MG tablet   Yes No   Sig: Take 1 tablet (10 mg) by mouth daily   norgestimate-ethinyl estradiol (ORTHO-CYCLEN, SPRINTEC) 0.25-35 MG-MCG tablet   No No   Sig: Take 1 tablet by mouth daily   spironolactone (ALDACTONE) 25 MG tablet   Yes No   Sig: Take 25 mg by mouth      Facility-Administered Medications: None     Allergies   No Known Allergies    Physical Exam   Vital Signs: Temp: 97.8  F (36.6  C) Temp src: Temporal BP: 118/83 Pulse: 88   Resp: 16 SpO2: 97 %      Weight: 165 lbs 0 oz    Constitutional: NAD  Eyes: PERRL, EOMI  HENT: Oropharynx clear, MMM  Respiratory: Clear to auscultation bilaterally, good air movement, normal effort   Cardiovascular: RRR, no m/r/g. No peripheral edema.   GI: Soft, non-tender,  non-distended. No rebound tenderness or guarding.   Skin: Warm, dry   Neurologic: Alert. Responding to questions appropriately. Following commands. Face symmetric. Tongue midline. 5/5 upper and lower extremity strength symmetric bilaterally.   Psychiatric: Normal affect, appropriate      Data   Data reviewed today: I reviewed all medications, new labs and imaging results over the last 24 hours. I personally reviewed no images or EKG's today.    Recent Labs   Lab 07/08/23  2242   WBC 8.2   HGB 13.7   MCV 90         POTASSIUM 3.8   CHLORIDE 104   CO2 22   BUN 6.1   CR 0.72   ANIONGAP 13   LAYA 8.9   *       Recent Results (from the past 24 hour(s))   Head CT w/o contrast    Narrative    EXAM: CTA HEAD NECK W CONTRAST, CTV HEAD WITH CONTRAST, CT HEAD W/O CONTRAST  LOCATION: [facility]  DATE: 7/8/2023    INDICATION: Headache, vomiting, increased pain when lying down, aggravated by Valsalva, vision changes, photophobia  COMPARISON: None.  CONTRAST: 75 mL Isovue 370 (accession [id number]), 0 mL Isovue 370 (accession [id number]), 75 mL Isovue 370 (accession [id number])  TECHNIQUE:   1) Head and neck CT angiogram with IV contrast. Noncontrast head CT followed by axial helical CT images of the head and neck vessels obtained during the arterial phase of intravenous contrast administration. Axial 2D reconstructed images and multiplanar   3D MIP reconstructed images of the head and neck vessels were performed by the technologist. Dose reduction techniques were used. All stenosis measurements made according to NASCET criteria unless otherwise specified.  2) Head CT venogram with IV contrast. Axial helical CT images of the intracranial vessels obtained during the venous phase of intravenous contrast administration. Axial helical 2D reconstructed images and multiplanar 3D MIP reconstructed images of the   intracranial vessels were performed by the technologist. Dose reduction techniques  were used.     FINDINGS:   NONCONTRAST HEAD CT:   INTRACRANIAL CONTENTS: No intracranial hemorrhage, extraaxial collection, or mass effect.  No CT evidence of acute infarct. Normal parenchymal attenuation. Normal ventricles and sulci.     VISUALIZED ORBITS/SINUSES/MASTOIDS: Incompletely evaluated expansion of the left greater than right optic nerves/sheaths. Focal mucosal thickening in the right ethmoid air cells. No middle ear or mastoid effusion.    BONES/SOFT TISSUES: No acute abnormality.    HEAD CTA:  ANTERIOR CIRCULATION: No stenosis/occlusion, aneurysm, or high flow vascular malformation. Fetal origin of the right posterior cerebral artery from the anterior circulation. Asymmetrically smaller right A1 segment with a patent anterior communicating   artery.    POSTERIOR CIRCULATION: No stenosis/occlusion, aneurysm, or high flow vascular malformation. Balanced vertebral arteries supply a normal basilar artery.     HEAD CTV:  DURAL SINUSES: No significant stenosis or occlusion. Dominant right and smaller left transverse and sigmoid dural sinuses.    INTERNAL CEREBRAL VEINS: No significant stenosis or occlusion.      MAJOR CORTICAL VENOUS BRANCHES: No significant stenosis or occlusion.    NECK CTA:  RIGHT CAROTID: No measurable stenosis or dissection.    LEFT CAROTID: No measurable stenosis or dissection.    VERTEBRAL ARTERIES: No focal stenosis or dissection. Balanced vertebral arteries.    AORTIC ARCH: Classic aortic arch anatomy with no significant stenosis at the origin of the great vessels.    NONVASCULAR STRUCTURES: No high-grade osseous spinal canal or neural foraminal narrowing. Visualized portions of the lungs are clear.      Impression    IMPRESSION:   HEAD CT:  1.  No acute intracranial process.  2.  Asymmetric expansion of the left greater than right optic nerve/sheaths. Findings are nonspecific and further evaluation with dedicated orbit MRI with and without IV contrast is recommended to better  evaluate for an underlying lesion.    HEAD CTA:   1.  No significant stenosis, aneurysm, or high flow vascular malformation identified.  2.  Variant [place] of Fern[place]dez anatomy as above.    HEAD CTV:   1.  No intracranial venous thrombosis or stenosis.    NECK CTA:  1.  No hemodynamically significant stenosis in the neck vessels.   2.  No evidence for dissection.   CTA Head Neck with Contrast    Narrative    EXAM: CTA HEAD NECK W CONTRAST, CTV HEAD WITH CONTRAST, CT HEAD W/O CONTRAST  LOCATION: [facility]  DATE: 7/8/2023    INDICATION: Headache, vomiting, increased pain when lying down, aggravated by Valsalva, vision changes, photophobia  COMPARISON: None.  CONTRAST: 75 mL Isovue 370 (accession [id number]), 0 mL Isovue 370 (accession [id number]), 75 mL Isovue 370 (accession [id number])  TECHNIQUE:   1) Head and neck CT angiogram with IV contrast. Noncontrast head CT followed by axial helical CT images of the head and neck vessels obtained during the arterial phase of intravenous contrast administration. Axial 2D reconstructed images and multiplanar   3D MIP reconstructed images of the head and neck vessels were performed by the technologist. Dose reduction techniques were used. All stenosis measurements made according to NASCET criteria unless otherwise specified.  2) Head CT venogram with IV contrast. Axial helical CT images of the intracranial vessels obtained during the venous phase of intravenous contrast administration. Axial helical 2D reconstructed images and multiplanar 3D MIP reconstructed images of the   intracranial vessels were performed by the technologist. Dose reduction techniques were used.     FINDINGS:   NONCONTRAST HEAD CT:   INTRACRANIAL CONTENTS: No intracranial hemorrhage, extraaxial collection, or mass effect.  No CT evidence of acute infarct. Normal parenchymal attenuation. Normal ventricles and sulci.     VISUALIZED ORBITS/SINUSES/MASTOIDS: Incompletely evaluated  expansion of the left greater than right optic nerves/sheaths. Focal mucosal thickening in the right ethmoid air cells. No middle ear or mastoid effusion.    BONES/SOFT TISSUES: No acute abnormality.    HEAD CTA:  ANTERIOR CIRCULATION: No stenosis/occlusion, aneurysm, or high flow vascular malformation. Fetal origin of the right posterior cerebral artery from the anterior circulation. Asymmetrically smaller right A1 segment with a patent anterior communicating   artery.    POSTERIOR CIRCULATION: No stenosis/occlusion, aneurysm, or high flow vascular malformation. Balanced vertebral arteries supply a normal basilar artery.     HEAD CTV:  DURAL SINUSES: No significant stenosis or occlusion. Dominant right and smaller left transverse and sigmoid dural sinuses.    INTERNAL CEREBRAL VEINS: No significant stenosis or occlusion.      MAJOR CORTICAL VENOUS BRANCHES: No significant stenosis or occlusion.    NECK CTA:  RIGHT CAROTID: No measurable stenosis or dissection.    LEFT CAROTID: No measurable stenosis or dissection.    VERTEBRAL ARTERIES: No focal stenosis or dissection. Balanced vertebral arteries.    AORTIC ARCH: Classic aortic arch anatomy with no significant stenosis at the origin of the great vessels.    NONVASCULAR STRUCTURES: No high-grade osseous spinal canal or neural foraminal narrowing. Visualized portions of the lungs are clear.      Impression    IMPRESSION:   HEAD CT:  1.  No acute intracranial process.  2.  Asymmetric expansion of the left greater than right optic nerve/sheaths. Findings are nonspecific and further evaluation with dedicated orbit MRI with and without IV contrast is recommended to better evaluate for an underlying lesion.    HEAD CTA:   1.  No significant stenosis, aneurysm, or high flow vascular malformation identified.  2.  Variant [place] of Fern[place]dez anatomy as above.    HEAD CTV:   1.  No intracranial venous thrombosis or stenosis.    NECK CTA:  1.  No hemodynamically significant  stenosis in the neck vessels.   2.  No evidence for dissection.   CTV Head with Contrast    Narrative    EXAM: CTA HEAD NECK W CONTRAST, CTV HEAD WITH CONTRAST, CT HEAD W/O CONTRAST  LOCATION: [facility]  DATE: 7/8/2023    INDICATION: Headache, vomiting, increased pain when lying down, aggravated by Valsalva, vision changes, photophobia  COMPARISON: None.  CONTRAST: 75 mL Isovue 370 (accession [id number]), 0 mL Isovue 370 (accession [id number]), 75 mL Isovue 370 (accession [id number])  TECHNIQUE:   1) Head and neck CT angiogram with IV contrast. Noncontrast head CT followed by axial helical CT images of the head and neck vessels obtained during the arterial phase of intravenous contrast administration. Axial 2D reconstructed images and multiplanar   3D MIP reconstructed images of the head and neck vessels were performed by the technologist. Dose reduction techniques were used. All stenosis measurements made according to NASCET criteria unless otherwise specified.  2) Head CT venogram with IV contrast. Axial helical CT images of the intracranial vessels obtained during the venous phase of intravenous contrast administration. Axial helical 2D reconstructed images and multiplanar 3D MIP reconstructed images of the   intracranial vessels were performed by the technologist. Dose reduction techniques were used.     FINDINGS:   NONCONTRAST HEAD CT:   INTRACRANIAL CONTENTS: No intracranial hemorrhage, extraaxial collection, or mass effect.  No CT evidence of acute infarct. Normal parenchymal attenuation. Normal ventricles and sulci.     VISUALIZED ORBITS/SINUSES/MASTOIDS: Incompletely evaluated expansion of the left greater than right optic nerves/sheaths. Focal mucosal thickening in the right ethmoid air cells. No middle ear or mastoid effusion.    BONES/SOFT TISSUES: No acute abnormality.    HEAD CTA:  ANTERIOR CIRCULATION: No stenosis/occlusion, aneurysm, or high flow vascular malformation. Fetal  origin of the right posterior cerebral artery from the anterior circulation. Asymmetrically smaller right A1 segment with a patent anterior communicating   artery.    POSTERIOR CIRCULATION: No stenosis/occlusion, aneurysm, or high flow vascular malformation. Balanced vertebral arteries supply a normal basilar artery.     HEAD CTV:  DURAL SINUSES: No significant stenosis or occlusion. Dominant right and smaller left transverse and sigmoid dural sinuses.    INTERNAL CEREBRAL VEINS: No significant stenosis or occlusion.      MAJOR CORTICAL VENOUS BRANCHES: No significant stenosis or occlusion.    NECK CTA:  RIGHT CAROTID: No measurable stenosis or dissection.    LEFT CAROTID: No measurable stenosis or dissection.    VERTEBRAL ARTERIES: No focal stenosis or dissection. Balanced vertebral arteries.    AORTIC ARCH: Classic aortic arch anatomy with no significant stenosis at the origin of the great vessels.    NONVASCULAR STRUCTURES: No high-grade osseous spinal canal or neural foraminal narrowing. Visualized portions of the lungs are clear.      Impression    IMPRESSION:   HEAD CT:  1.  No acute intracranial process.  2.  Asymmetric expansion of the left greater than right optic nerve/sheaths. Findings are nonspecific and further evaluation with dedicated orbit MRI with and without IV contrast is recommended to better evaluate for an underlying lesion.    HEAD CTA:   1.  No significant stenosis, aneurysm, or high flow vascular malformation identified.  2.  Variant [place] of [place] anatomy as above.    HEAD CTV:   1.  No intracranial venous thrombosis or stenosis.    NECK CTA:  1.  No hemodynamically significant stenosis in the neck vessels.   2.  No evidence for dissection.   MR Orbit w/o & w Contrast    Narrative    EXAM: MR ORBIT W/O AND W CONTRAST  LOCATION: [facility]  DATE: 7/9/2023    INDICATION: Left optic nerve sheath larger than right on CT. MR recommended. Headache, vomiting,  photophobia, vision changes.  COMPARISON: 07/08/2023.  CONTRAST: 7 mL Gadavist.   TECHNIQUE:  1) Routine multiplanar multisequence head MRI without and with intravenous contrast.  2) Dedicated high-resolution multiplanar multisequence MRI of the orbits without and with intravenous contrast.    FINDINGS:  INTRACRANIAL CONTENTS: No acute or subacute infarct. No mass, acute hemorrhage, or extra-axial fluid collections. Normal brain parenchymal signal. Normal ventricles and sulci. Normal position of the cerebellar tonsils. No pathologic contrast enhancement.    SELLA: No abnormality accounting for technique.    OSSEOUS STRUCTURES/SOFT TISSUES: Normal marrow signal. The major intracranial vascular flow voids are maintained.     ORBITS: Dedicated MRI of the orbits was performed. Asymmetric dilatation of the left optic nerve sheath with suggestion of peripheral enhancement (series 14 image 14). No definite enhancement or edema within the left optic nerve. Intact globes.   Symmetrical extraocular musculature without thickening/enlargement. The right intraorbital, canalicular and prechiasmatic optic nerve segments are normal in size and signal intensity bilaterally. The optic chiasm and proximal optic tracts are   unremarkable. No localized inflammation of the intraconal or extraconal orbital fat. Normal lacrimal glands. No intraorbital mass.     SINUSES/MASTOIDS: Focal mucosal thickening in the right ethmoid air cells. No middle ear or mastoid effusion.       Impression    IMPRESSION:  HEAD MRI:  1.  No acute intracranial process. Specifically, no acute intracranial hemorrhage, focal mass lesion, or acute infarct.    ORBIT MRI:  1.  Asymmetric dilatation of the left optic nerve sheath with suggestion of peripheral enhancement. No definite edema or enhancement of the left optic nerve. Findings may reflect optic perineuritis (from an infectious or inflammatory etiology) versus   unilateral pseudotumor/idiopathic intracranial  hypertension.  2.  Unremarkable appearance of the right orbit. Specifically, no abnormal dilatation of the optic nerve sheath or abnormal signal within the right optic nerve.    Findings were discussed with Dr. Arvizu at 3:16 AM on 07/19/2023.   MR Brain w/o & w Contrast    Narrative    EXAM: MR ORBIT W/O AND W CONTRAST  LOCATION: [facility]  DATE: 7/9/2023    INDICATION: Left optic nerve sheath larger than right on CT. MR recommended. Headache, vomiting, photophobia, vision changes.  COMPARISON: 07/08/2023.  CONTRAST: 7 mL Gadavist.   TECHNIQUE:  1) Routine multiplanar multisequence head MRI without and with intravenous contrast.  2) Dedicated high-resolution multiplanar multisequence MRI of the orbits without and with intravenous contrast.    FINDINGS:  INTRACRANIAL CONTENTS: No acute or subacute infarct. No mass, acute hemorrhage, or extra-axial fluid collections. Normal brain parenchymal signal. Normal ventricles and sulci. Normal position of the cerebellar tonsils. No pathologic contrast enhancement.    SELLA: No abnormality accounting for technique.    OSSEOUS STRUCTURES/SOFT TISSUES: Normal marrow signal. The major intracranial vascular flow voids are maintained.     ORBITS: Dedicated MRI of the orbits was performed. Asymmetric dilatation of the left optic nerve sheath with suggestion of peripheral enhancement (series 14 image 14). No definite enhancement or edema within the left optic nerve. Intact globes.   Symmetrical extraocular musculature without thickening/enlargement. The right intraorbital, canalicular and prechiasmatic optic nerve segments are normal in size and signal intensity bilaterally. The optic chiasm and proximal optic tracts are   unremarkable. No localized inflammation of the intraconal or extraconal orbital fat. Normal lacrimal glands. No intraorbital mass.     SINUSES/MASTOIDS: Focal mucosal thickening in the right ethmoid air cells. No middle ear or mastoid  effusion.       Impression    IMPRESSION:  HEAD MRI:  1.  No acute intracranial process. Specifically, no acute intracranial hemorrhage, focal mass lesion, or acute infarct.    ORBIT MRI:  1.  Asymmetric dilatation of the left optic nerve sheath with suggestion of peripheral enhancement. No definite edema or enhancement of the left optic nerve. Findings may reflect optic perineuritis (from an infectious or inflammatory etiology) versus   unilateral pseudotumor/idiopathic intracranial hypertension.  2.  Unremarkable appearance of the right orbit. Specifically, no abnormal dilatation of the optic nerve sheath or abnormal signal within the right optic nerve.    Findings were discussed with Dr. Arvizu at 3:16 AM on 07/19/2023.

## 2023-07-09 NOTE — CONSULTS
DATE OF SERVICE : 7/9/2023    DATE OF ADMISSION: 7/8/2023    NEUROLOGICAL CONSULTATION    REQUESTED BY Dr. Flores att. providers found    SOURCE OF INFORMATION:Patient and [facility] EHR    REASON FOR CONSULTATION:   Headaches     HISTORY OF PRESENT ILLNESS:       She is a 33 years old female presenting to the emergency room for evaluation regarding headaches.  Onset of headache 7/7 bilateral radiating into neck, throbbing headache generalized associated photo phonophobia nausea and vomiting pain with eye movement she had a virtual visit she was prescribed sumatriptan with no help headaches gradually progressed worsened associated nausea and vomiting prompting for further ED eval overall generalized feeling weak and chills initial vitals were stable afebrile  MR brain/orbits suggestive of asymmetric dilatation of left optic nerve sheath with no definite edema or enhancement f findings suggestive of optic perineuritis versus idiopathic intracranial hypertension        Past Medical History:   Diagnosis Date     Acne      Contraception 2014    Nuva Ring     Irregular menstrual cycle 2007     Keratosis pilaris      arms     Meningitis, unspecified(322.9) age 4 months    hosp. 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     Perennial allergic rhinitis        PSHx:   Past Surgical History:   Procedure Laterality Date     NO HISTORY OF SURGERY       Medications Prior to Admission   Medication Sig Dispense Refill Last Dose     albuterol (PROAIR HFA/PROVENTIL HFA/VENTOLIN HFA) 108 (90 BASE) MCG/ACT Inhaler Inhale 1-2 puffs into the lungs every 6 hours as needed   prn med     loratadine (CLARITIN) 10 MG tablet Take 1 tablet (10 mg) by mouth daily 30 tablet 1 7/8/2023     ondansetron (ZOFRAN ODT) 4 MG ODT tab Take 4 mg by mouth every 8 hours as needed for nausea   7/8/2023     Prenatal Vit-Fe Fumarate-FA (PRENATAL MULTIVITAMIN W/IRON) 27-0.8 MG tablet Take 1 tablet by mouth  daily   7/8/2023     SUMAtriptan (IMITREX) 50 MG tablet Take 50 mg by mouth at onset of headache for migraine   7/8/2023     Current Facility-Administered Medications   Medication Dose Route Frequency     sodium chloride (PF)  3 mL Intracatheter Q8H     Current Facility-Administered Medications   Medication Dose Route Frequency     acetaminophen  650 mg Oral Q6H PRN    Or     acetaminophen  650 mg Rectal Q6H PRN     bisacodyl  10 mg Rectal Daily PRN     HYDROmorphone  1 mg Oral Q4H PRN     HYDROmorphone  0.2 mg Intravenous Q2H PRN     HYDROmorphone  0.4 mg Intravenous Q2H PRN     HYDROmorphone  2 mg Oral Q4H PRN     lidocaine 4%   Topical Q1H PRN     lidocaine (buffered or not buffered)  0.1-1 mL Other Q1H PRN     melatonin  1 mg Oral At Bedtime PRN     naloxone  0.2 mg Intravenous Q2 Min PRN    Or     naloxone  0.4 mg Intravenous Q2 Min PRN    Or     naloxone  0.2 mg Intramuscular Q2 Min PRN    Or     naloxone  0.4 mg Intramuscular Q2 Min PRN     ondansetron  4 mg Oral Q6H PRN    Or     ondansetron  4 mg Intravenous Q6H PRN     polyethylene glycol  17 g Oral Daily PRN     prochlorperazine  10 mg Intravenous Q6H PRN    Or     prochlorperazine  10 mg Oral Q6H PRN    Or     prochlorperazine  25 mg Rectal Q12H PRN     sodium chloride (PF)  3 mL Intracatheter q1 min prn        No Known Allergies      SocHx:  reports that she has never smoked. She has never used smokeless tobacco. She reports that she does not drink alcohol and does not use drugs.    Family History   Problem Relation Age of Onset     Cancer - colorectal Paternal Grandfather      Prostate Cancer Paternal Grandfather      Allergies Sister      Allergies Sister      Allergies Mother      Gastrointestinal Disease Sister         ulcer     Gastrointestinal Disease Paternal Grandmother         polyps     Heart Disease Mother         heart M.     Lipids Mother      Osteoporosis Paternal Grandmother          PHYSICAL EXAM  /73 (BP Location: Left arm,  "Patient Position: Left side, Cuff Size: Adult Regular)   Pulse 82   Temp 100  F (37.8  C) (Oral)   Resp 16   Ht 1.6 m (5' 3\")   Wt 74.8 kg (165 lb)   SpO2 94%   BMI 29.23 kg/m      Patient is in mild distress no labored breathing  Limited cervical range of motion  Alert and oriented to current situations fund of knowledge is intact spontaneous speech and comprehension is normal  Pupils equal and round reacting to light no afferent pupillary defect, visual fields are full, face is symmetric hearing normal to conversation tongue is in midline  Normal bilateral upper and lower EXTR muscle strength reflexes normal and symmetrical plantars are equivocal no tremors or involuntary movements  Lab and X-ray:   Recent Labs   Lab Test 07/08/23  2242   WBC 8.2   HGB 13.7      POTASSIUM 3.8     Recent Labs   Lab Test 07/08/23  2242   POTASSIUM 3.8   CHLORIDE 104   BUN 6.1     Recent Labs   Lab Test 07/08/23  2242   WBC 8.2   HGB 13.7   MCV 90        No lab results found.    Invalid input(s): TBILI  No lab results found.    Invalid input(s): CHOLESTEROL, TRIGLYCERIDES  No lab results found.    Laboratory results were personally interpreted and reviewed in detail.  Imaging studies reviewed and interpreted in detail      Summary: List Problems:   Patient Active Problem List   Diagnosis     Other acne     Perennial allergic rhinitis     Contraception     Asthma, well controlled, mild intermittent     Optic neuritis, left     Nonintractable headache, unspecified chronicity pattern, unspecified headache type     Nausea and vomiting, unspecified vomiting type       ASSESSMENT:   Regina Lawson presented with new onset intractable headaches since 7/7, no associated vision loss.  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

## 2023-07-09 NOTE — PROGRESS NOTES
RECEIVING UNIT ED HANDOFF REVIEW    ED Nurse Handoff Report was reviewed by: Olivier Lehman RN on July 9, 2023 at 8:03 AM

## 2023-07-09 NOTE — ED PROVIDER NOTES
"  History     Chief Complaint:  Headache       HPI   Regina Lawson is a 33 year old female who presents accompanied by her parents for evaluation of a headache. Yesterday the patient experienced the gradual onset of a headache with pain behind her eyes and associated nausea and photophobia. Today her headache worsened significantly and she started to develop vomiting. She has taken Zofran at home without significant improvement. Due to her ongoing headache tonight she came into the ED for evaluation. She denies any history of migraines. She denies any fever, sore throat, or diarrhea.  She has not felt ill.  No numbness tingling or weakness.  Did a virtual visit earlier today and prescribed sumatriptan    Independent Historian:   None - Patient Only    Medications:    albuterol inhaler  Zyrtec  Levonorgestrel   Loratadine  Spironolactone  Ortho-cyclen   Nuvaring     Past Medical History:    Irregular menstrual period  Congenital anomaly of spine   Asthma  Acne     Past Surgical History:    Appendectomy  [place] teeth extraction   section     Physical Exam     Patient Vitals for the past 24 hrs:   BP Temp Temp src Pulse Resp SpO2 Height Weight   23 2150 118/83 97.8  F (36.6  C) Temporal 88 16 97 % 1.6 m (5' 3\") 74.8 kg (165 lb)        Physical Exam  General: Lying in the bed  Eyes:  The pupils are equal and round.  Unable to see papilledema.  Extraocular movements intact.  Visual fields normal    Conjunctivae and sclerae are normal.  Covers her eyes with eye covering to guarding and slight  ENT:    Wearing a mask  Neck:  Normal range of motion, no neck stiffness  CV:  Regular rate, regular rhythm     Skin warm and well perfused   Resp:  Non labored breathing on room air    No tachypnea    No cough heard, lungs clear bilaterally  MS:  Normal muscular tone  Skin:  No rash or acute skin lesions noted  Neuro:   Awake, alert.  Sensation intact to light touch bilaterally on upper and lower " extremities    Speech is normal and fluent.    Face is symmetric.     Moves all extremities equally, no weakness  Psych: Normal affect.  Appropriate interactions.    Emergency Department Course     Imaging:  MR Brain w/o & w Contrast   Final Result   IMPRESSION:   HEAD MRI:   1.  No acute intracranial process. Specifically, no acute intracranial hemorrhage, focal mass lesion, or acute infarct.      ORBIT MRI:   1.  Asymmetric dilatation of the left optic nerve sheath with suggestion of peripheral enhancement. No definite edema or enhancement of the left optic nerve. Findings may reflect optic perineuritis (from an infectious or inflammatory etiology) versus    unilateral pseudotumor/idiopathic intracranial hypertension.   2.  Unremarkable appearance of the right orbit. Specifically, no abnormal dilatation of the optic nerve sheath or abnormal signal within the right optic nerve.      Findings were discussed with Dr. Arvizu at 3:16 AM on 07/19/2023.      MR Orbit w/o & w Contrast   Final Result   IMPRESSION:   HEAD MRI:   1.  No acute intracranial process. Specifically, no acute intracranial hemorrhage, focal mass lesion, or acute infarct.      ORBIT MRI:   1.  Asymmetric dilatation of the left optic nerve sheath with suggestion of peripheral enhancement. No definite edema or enhancement of the left optic nerve. Findings may reflect optic perineuritis (from an infectious or inflammatory etiology) versus    unilateral pseudotumor/idiopathic intracranial hypertension.   2.  Unremarkable appearance of the right orbit. Specifically, no abnormal dilatation of the optic nerve sheath or abnormal signal within the right optic nerve.      Findings were discussed with Dr. Arvizu at 3:16 AM on 07/19/2023.      CTV Head with Contrast   Final Result   IMPRESSION:    HEAD CT:   1.  No acute intracranial process.   2.  Asymmetric expansion of the left greater than right optic nerve/sheaths. Findings are nonspecific and further  evaluation with dedicated orbit MRI with and without IV contrast is recommended to better evaluate for an underlying lesion.      HEAD CTA:    1.  No significant stenosis, aneurysm, or high flow vascular malformation identified.   2.  Variant Crow of Fernández anatomy as above.      HEAD CTV:    1.  No intracranial venous thrombosis or stenosis.      NECK CTA:   1.  No hemodynamically significant stenosis in the neck vessels.    2.  No evidence for dissection.      CTA Head Neck with Contrast   Final Result   IMPRESSION:    HEAD CT:   1.  No acute intracranial process.   2.  Asymmetric expansion of the left greater than right optic nerve/sheaths. Findings are nonspecific and further evaluation with dedicated orbit MRI with and without IV contrast is recommended to better evaluate for an underlying lesion.      HEAD CTA:    1.  No significant stenosis, aneurysm, or high flow vascular malformation identified.   2.  Variant [place] of Fern[place]dez anatomy as above.      HEAD CTV:    1.  No intracranial venous thrombosis or stenosis.      NECK CTA:   1.  No hemodynamically significant stenosis in the neck vessels.    2.  No evidence for dissection.      Head CT w/o contrast   Final Result   IMPRESSION:    HEAD CT:   1.  No acute intracranial process.   2.  Asymmetric expansion of the left greater than right optic nerve/sheaths. Findings are nonspecific and further evaluation with dedicated orbit MRI with and without IV contrast is recommended to better evaluate for an underlying lesion.      HEAD CTA:    1.  No significant stenosis, aneurysm, or high flow vascular malformation identified.   2.  Variant Crow of Fern[place]dez anatomy as above.      HEAD CTV:    1.  No intracranial venous thrombosis or stenosis.      NECK CTA:   1.  No hemodynamically significant stenosis in the neck vessels.    2.  No evidence for dissection.      Report per radiology    Laboratory:  Labs Ordered and Resulted from Time of ED Arrival to Time of ED  Departure   BASIC METABOLIC PANEL - Abnormal       Result Value    Sodium 139      Potassium 3.8      Chloride 104      Carbon Dioxide (CO2) 22      Anion Gap 13      Urea Nitrogen 6.1      Creatinine 0.72      Calcium 8.9      Glucose 131 (*)     GFR Estimate >90     COVID-19 VIRUS (CORONAVIRUS) BY PCR - Normal    SARS CoV2 PCR Negative     HCG QUALITATIVE PREGNANCY - Normal    hCG Serum Qualitative Negative     CBC WITH PLATELETS - Normal    WBC Count 8.2      RBC Count 4.53      Hemoglobin 13.7      Hematocrit 40.6      MCV 90      MCH 30.2      MCHC 33.7      RDW 12.3      Platelet Count 311     ERYTHROCYTE SEDIMENTATION RATE AUTO - Normal    Erythrocyte Sedimentation Rate 7     CRP INFLAMMATION   ANTI NUCLEAR EMMANUEL IGG BY IFA WITH REFLEX   ANGIOTENSIN CONVERTING ENZYME       Emergency Department Course & Assessments:     Interventions:  2242 NS 1,000 mL IV   2242 Zofran 4 mg IV   2302 Compazine 10 mg IV   2302 Benadryl 25 mg IV   0111 Toradol 15 mg IV   0421 Dilaudid 0.5 mg IV      Assessments:  2239: The patient was seen and evaluated.     0034: I updated and reassessed the patient.     0406: I updated and reassessed the patient.     0525: I updated and reassessed the patient.     Independent Interpretation (X-rays, CTs, rhythm strip):  I independently reviewed CT head -no acute intracranial hemorrhage    Consultations/Discussion of Management or Tests:  0316: I spoke with Dr. Valencia of the radiology service regarding patient's presentation, findings, and plan of care.      0512: I spoke with Dr. Alvarez of the neurology service from [facility] regarding patient's presentation, findings, and plan of care.     0518: I spoke with Dr. Trevino of the neurology service regarding patient's presentation, findings, and plan of care.     0553: I spoke with Dr. Oro of the hospitalist service regarding patient's presentation, findings, and plan of care.      Disposition:  The patient was admitted to the hospital  under the care of Dr. Oro.     Impression & Plan      Medical Decision Making:  Regina Lawson is a 33-year-old female who presented to the emergency department with a headache.  Patient with 2 days of a headache.  A lot of her headache does sound suggestive of migraine though she does not have a history of migraine or headaches.  She has not felt ill and has not had a fever.  COVID test was negative.  Imaging was obtained that shows no acute intracranial hemorrhage.  There is no aneurysm or evidence of subarachnoid hemorrhage.  There is no evidence of venous sinus thrombosis.  There was asymmetric finding on left optic nerve and MRI orbits was  recommended.  This shows asymmetric finding on the left optic nerve but no mass.  Differential is a neuritis versus asymmetric pseudotumor.  Radiologist favored optic perineuritis given the asymmetric nature of the finding.  Patient has no vision changes.  May have some pain with movement of eyes though difficult to tell.  Patient was feeling a little bit better in the emergency department but then pain worsened.  Initially thought she may need to be transferred to the [facility] so tried to see if [facility] had a bed which they did not and then spoke to neurology through [facility] with the possibility of transfer to [facility] but after discussion with neurology at [facility] and neurology at [facility], neurology thought she could stay here.  Recommended LP with opening and closing pressure and MS studies along with usual work-up and likely high-dose steroids.  Would like LP done before steroids.  I discussed patient with hospitalist.  Hospitalist will place neurology consult and have IR perform LP.     Diagnosis:    ICD-10-CM    1. Optic neuritis, left  H46.9       2. Nonintractable headache, unspecified chronicity pattern, unspecified headache type  R51.9       3. Nausea and vomiting, unspecified vomiting type  R11.2          Scribe Disclosure:  Wili SCHUSTER, am  serving as a scribe at 10:33 PM on 7/8/2023 to document services personally performed by Mariaelena Arvizu MD based on my observations and the provider's statements to me.   7/8/2023   Mariaelena Arvizu MD Goertz, Maria Kristine, MD  07/09/23 0916

## 2023-07-09 NOTE — PLAN OF CARE
Goal Outcome Evaluation:      Plan of Care Reviewed With: patient        Shift: 7/9/2023 - 09:00-19:30    Orientation: A&Ox4, calm, cooperative, pleasant. Tired from lack of sleep. Keeps eyes covered with cloth d/t headache and sensitivity in vision    Vitals/Tele: VSS on RA; not on telemetry    IV Access/drains: PIV right arm infusing LR @ 75 ml/hr    Diet: Clear Liquid    Mobility: SBA - requests assistance with transfer; independent at baseline    GI/: Continent B&B; voiding adequately and appropriately; last BM Friday 7/7 per verbal report from patient    Wound/Skin: WDL -- no redness, scabbing, or bruising    Consults: Hospitalist following; General Neurology consult placed; Radiology to see patient for lumbar puncture this evening    Discharge Plan: TBD    Shift Note: Patient c/o headache -- rated 7 out of 10 -- Dilaudid 0.4mg given x4 with relief. IV Zofran given in ED, but patient c/o nausea upon arriving to unit. IV Compazine given x1 with relief.       See Flow sheets for assessment

## 2023-07-09 NOTE — PROGRESS NOTES
"[facility]    Medicine Progress Note - Hospitalist Service        Date of Admission:  7/8/2023 10:28 PM    Assessment & Plan:   Regina Lawson is a 33 year old female with no significant past medical history who presents with headache, nausea, vomiting. Admitted on 7/8/2023.      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 versus unilateral pseudotumor/idiopathic intracranial hypertension  *Case discussed with general neurology who recommended LP and likely high dose steroids following  -Await General neurology evaluation this morning  -Defer order of LP to neurology to ensure all appropriate studies are ordered   -Antiemetics PRN  -Hydromorphone PO/IV PRN     Diet: Combination Diet Clear Liquid     DVT Prophylaxis: Pneumatic Compression Devices   Slois Catheter: Not present  Code Status: Full Code     Disposition Plan       Expected Discharge Date: 07/11/2023              Entered: Mitchell Overton MD 07/09/2023, 9:35 AM        Clinically Significant Risk Factors Present on Admission                       # Overweight: Estimated body mass index is 29.23 kg/m  as calculated from the following:    Height as of this encounter: 1.6 m (5' 3\").    Weight as of this encounter: 74.8 kg (165 lb).               The patient's care was discussed with the Bedside Nurse and Patient.    Medical Decision Making       **CLEAR ALL SELECTIONS**        Labs/Imaging Reviewed:  See Information above and Data section below      Mitchell Overton MD  Hospitalist Service  [facility]  Text Page 7AM-6PM  Securely message with the Vocera Web Console (learn more here)  Text page via [employer] Paging/Directory    ______________________________________________________________________    Interval " "History   Patient reports recurrence of headache and nausea.  She is quite photophobic.  No focal neurological deficit like weakness, tingling or numbness.    Data reviewed today: I reviewed all medications, new labs and imaging results over the last 24 hours. I personally reviewed no images or EKG's today.    Physical Exam   Vital signs:  Temp: 99.6  F (37.6  C) Temp src: Oral BP: 113/74 Pulse: 77   Resp: 16 SpO2: 94 % O2 Device: None (Room air)   Height: 160 cm (5' 3\") Weight: 74.8 kg (165 lb)  Estimated body mass index is 29.23 kg/m  as calculated from the following:    Height as of this encounter: 1.6 m (5' 3\").    Weight as of this encounter: 74.8 kg (165 lb).      Wt Readings from Last 2 Encounters:   07/08/23 74.8 kg (165 lb)   01/26/18 75.6 kg (166 lb 11.2 oz)       Gen: AAOX3, NAD, photophobic  HEENT: Supple neck, moist oral mucosa, no pallor  Resp: CTA B/L, normal WOB  CVS: RRR, no murmur  Abd/GI: Soft, non-tender. BS- normoactive.    Skin: Warm, dry no rashes  MSK: No joint deformities, no pedal edema  Neuro- CN- intact. No focal deficits.        Data   Recent Labs   Lab 07/08/23  2242   WBC 8.2   HGB 13.7   MCV 90         POTASSIUM 3.8   CHLORIDE 104   CO2 22   BUN 6.1   CR 0.72   ANIONGAP 13   LAYA 8.9   *       Recent Results (from the past 24 hour(s))   Head CT w/o contrast    Narrative    EXAM: CTA HEAD NECK W CONTRAST, CTV HEAD WITH CONTRAST, CT HEAD W/O CONTRAST  LOCATION: [facility]  DATE: 7/8/2023    INDICATION: Headache, vomiting, increased pain when lying down, aggravated by Valsalva, vision changes, photophobia  COMPARISON: None.  CONTRAST: 75 mL Isovue 370 (accession [id number]), 0 mL Isovue 370 (accession [id number]), 75 mL Isovue 370 (accession [id number])  TECHNIQUE:   1) Head and neck CT angiogram with IV contrast. Noncontrast head CT followed by axial helical CT images of the head and neck vessels obtained during the arterial phase of intravenous " contrast administration. Axial 2D reconstructed images and multiplanar   3D MIP reconstructed images of the head and neck vessels were performed by the technologist. Dose reduction techniques were used. All stenosis measurements made according to NASCET criteria unless otherwise specified.  2) Head CT venogram with IV contrast. Axial helical CT images of the intracranial vessels obtained during the venous phase of intravenous contrast administration. Axial helical 2D reconstructed images and multiplanar 3D MIP reconstructed images of the   intracranial vessels were performed by the technologist. Dose reduction techniques were used.     FINDINGS:   NONCONTRAST HEAD CT:   INTRACRANIAL CONTENTS: No intracranial hemorrhage, extraaxial collection, or mass effect.  No CT evidence of acute infarct. Normal parenchymal attenuation. Normal ventricles and sulci.     VISUALIZED ORBITS/SINUSES/MASTOIDS: Incompletely evaluated expansion of the left greater than right optic nerves/sheaths. Focal mucosal thickening in the right ethmoid air cells. No middle ear or mastoid effusion.    BONES/SOFT TISSUES: No acute abnormality.    HEAD CTA:  ANTERIOR CIRCULATION: No stenosis/occlusion, aneurysm, or high flow vascular malformation. Fetal origin of the right posterior cerebral artery from the anterior circulation. Asymmetrically smaller right A1 segment with a patent anterior communicating   artery.    POSTERIOR CIRCULATION: No stenosis/occlusion, aneurysm, or high flow vascular malformation. Balanced vertebral arteries supply a normal basilar artery.     HEAD CTV:  DURAL SINUSES: No significant stenosis or occlusion. Dominant right and smaller left transverse and sigmoid dural sinuses.    INTERNAL CEREBRAL VEINS: No significant stenosis or occlusion.      MAJOR CORTICAL VENOUS BRANCHES: No significant stenosis or occlusion.    NECK CTA:  RIGHT CAROTID: No measurable stenosis or dissection.    LEFT CAROTID: No measurable stenosis or  dissection.    VERTEBRAL ARTERIES: No focal stenosis or dissection. Balanced vertebral arteries.    AORTIC ARCH: Classic aortic arch anatomy with no significant stenosis at the origin of the great vessels.    NONVASCULAR STRUCTURES: No high-grade osseous spinal canal or neural foraminal narrowing. Visualized portions of the lungs are clear.      Impression    IMPRESSION:   HEAD CT:  1.  No acute intracranial process.  2.  Asymmetric expansion of the left greater than right optic nerve/sheaths. Findings are nonspecific and further evaluation with dedicated orbit MRI with and without IV contrast is recommended to better evaluate for an underlying lesion.    HEAD CTA:   1.  No significant stenosis, aneurysm, or high flow vascular malformation identified.  2.  Variant [place] anatomy as above.    HEAD CTV:   1.  No intracranial venous thrombosis or stenosis.    NECK CTA:  1.  No hemodynamically significant stenosis in the neck vessels.   2.  No evidence for dissection.   CTA Head Neck with Contrast    Narrative    EXAM: CTA HEAD NECK W CONTRAST, CTV HEAD WITH CONTRAST, CT HEAD W/O CONTRAST  LOCATION: [facility]  DATE: 7/8/2023    INDICATION: Headache, vomiting, increased pain when lying down, aggravated by Valsalva, vision changes, photophobia  COMPARISON: None.  CONTRAST: 75 mL Isovue 370 (accession [id number]), 0 mL Isovue 370 (accession [id number]), 75 mL Isovue 370 (accession [id number])  TECHNIQUE:   1) Head and neck CT angiogram with IV contrast. Noncontrast head CT followed by axial helical CT images of the head and neck vessels obtained during the arterial phase of intravenous contrast administration. Axial 2D reconstructed images and multiplanar   3D MIP reconstructed images of the head and neck vessels were performed by the technologist. Dose reduction techniques were used. All stenosis measurements made according to NASCET criteria unless otherwise specified.  2) Head CT  venogram with IV contrast. Axial helical CT images of the intracranial vessels obtained during the venous phase of intravenous contrast administration. Axial helical 2D reconstructed images and multiplanar 3D MIP reconstructed images of the   intracranial vessels were performed by the technologist. Dose reduction techniques were used.     FINDINGS:   NONCONTRAST HEAD CT:   INTRACRANIAL CONTENTS: No intracranial hemorrhage, extraaxial collection, or mass effect.  No CT evidence of acute infarct. Normal parenchymal attenuation. Normal ventricles and sulci.     VISUALIZED ORBITS/SINUSES/MASTOIDS: Incompletely evaluated expansion of the left greater than right optic nerves/sheaths. Focal mucosal thickening in the right ethmoid air cells. No middle ear or mastoid effusion.    BONES/SOFT TISSUES: No acute abnormality.    HEAD CTA:  ANTERIOR CIRCULATION: No stenosis/occlusion, aneurysm, or high flow vascular malformation. Fetal origin of the right posterior cerebral artery from the anterior circulation. Asymmetrically smaller right A1 segment with a patent anterior communicating   artery.    POSTERIOR CIRCULATION: No stenosis/occlusion, aneurysm, or high flow vascular malformation. Balanced vertebral arteries supply a normal basilar artery.     HEAD CTV:  DURAL SINUSES: No significant stenosis or occlusion. Dominant right and smaller left transverse and sigmoid dural sinuses.    INTERNAL CEREBRAL VEINS: No significant stenosis or occlusion.      MAJOR CORTICAL VENOUS BRANCHES: No significant stenosis or occlusion.    NECK CTA:  RIGHT CAROTID: No measurable stenosis or dissection.    LEFT CAROTID: No measurable stenosis or dissection.    VERTEBRAL ARTERIES: No focal stenosis or dissection. Balanced vertebral arteries.    AORTIC ARCH: Classic aortic arch anatomy with no significant stenosis at the origin of the great vessels.    NONVASCULAR STRUCTURES: No high-grade osseous spinal canal or neural foraminal narrowing.  Visualized portions of the lungs are clear.      Impression    IMPRESSION:   HEAD CT:  1.  No acute intracranial process.  2.  Asymmetric expansion of the left greater than right optic nerve/sheaths. Findings are nonspecific and further evaluation with dedicated orbit MRI with and without IV contrast is recommended to better evaluate for an underlying lesion.    HEAD CTA:   1.  No significant stenosis, aneurysm, or high flow vascular malformation identified.  2.  Variant [place] anatomy as above.    HEAD CTV:   1.  No intracranial venous thrombosis or stenosis.    NECK CTA:  1.  No hemodynamically significant stenosis in the neck vessels.   2.  No evidence for dissection.   CTV Head with Contrast    Narrative    EXAM: CTA HEAD NECK W CONTRAST, CTV HEAD WITH CONTRAST, CT HEAD W/O CONTRAST  LOCATION: [facility]  DATE: 7/8/2023    INDICATION: Headache, vomiting, increased pain when lying down, aggravated by Valsalva, vision changes, photophobia  COMPARISON: None.  CONTRAST: 75 mL Isovue 370 (accession [id number]), 0 mL Isovue 370 (accession [id number]), 75 mL Isovue 370 (accession [id number])  TECHNIQUE:   1) Head and neck CT angiogram with IV contrast. Noncontrast head CT followed by axial helical CT images of the head and neck vessels obtained during the arterial phase of intravenous contrast administration. Axial 2D reconstructed images and multiplanar   3D MIP reconstructed images of the head and neck vessels were performed by the technologist. Dose reduction techniques were used. All stenosis measurements made according to NASCET criteria unless otherwise specified.  2) Head CT venogram with IV contrast. Axial helical CT images of the intracranial vessels obtained during the venous phase of intravenous contrast administration. Axial helical 2D reconstructed images and multiplanar 3D MIP reconstructed images of the   intracranial vessels were performed by the technologist. Dose  reduction techniques were used.     FINDINGS:   NONCONTRAST HEAD CT:   INTRACRANIAL CONTENTS: No intracranial hemorrhage, extraaxial collection, or mass effect.  No CT evidence of acute infarct. Normal parenchymal attenuation. Normal ventricles and sulci.     VISUALIZED ORBITS/SINUSES/MASTOIDS: Incompletely evaluated expansion of the left greater than right optic nerves/sheaths. Focal mucosal thickening in the right ethmoid air cells. No middle ear or mastoid effusion.    BONES/SOFT TISSUES: No acute abnormality.    HEAD CTA:  ANTERIOR CIRCULATION: No stenosis/occlusion, aneurysm, or high flow vascular malformation. Fetal origin of the right posterior cerebral artery from the anterior circulation. Asymmetrically smaller right A1 segment with a patent anterior communicating   artery.    POSTERIOR CIRCULATION: No stenosis/occlusion, aneurysm, or high flow vascular malformation. Balanced vertebral arteries supply a normal basilar artery.     HEAD CTV:  DURAL SINUSES: No significant stenosis or occlusion. Dominant right and smaller left transverse and sigmoid dural sinuses.    INTERNAL CEREBRAL VEINS: No significant stenosis or occlusion.      MAJOR CORTICAL VENOUS BRANCHES: No significant stenosis or occlusion.    NECK CTA:  RIGHT CAROTID: No measurable stenosis or dissection.    LEFT CAROTID: No measurable stenosis or dissection.    VERTEBRAL ARTERIES: No focal stenosis or dissection. Balanced vertebral arteries.    AORTIC ARCH: Classic aortic arch anatomy with no significant stenosis at the origin of the great vessels.    NONVASCULAR STRUCTURES: No high-grade osseous spinal canal or neural foraminal narrowing. Visualized portions of the lungs are clear.      Impression    IMPRESSION:   HEAD CT:  1.  No acute intracranial process.  2.  Asymmetric expansion of the left greater than right optic nerve/sheaths. Findings are nonspecific and further evaluation with dedicated orbit MRI with and without IV contrast is  recommended to better evaluate for an underlying lesion.    HEAD CTA:   1.  No significant stenosis, aneurysm, or high flow vascular malformation identified.  2.  Variant [place] anatomy as above.    HEAD CTV:   1.  No intracranial venous thrombosis or stenosis.    NECK CTA:  1.  No hemodynamically significant stenosis in the neck vessels.   2.  No evidence for dissection.   MR Orbit w/o & w Contrast    Narrative    EXAM: MR ORBIT W/O AND W CONTRAST  LOCATION: [facility]  DATE: 7/9/2023    INDICATION: Left optic nerve sheath larger than right on CT. MR recommended. Headache, vomiting, photophobia, vision changes.  COMPARISON: 07/08/2023.  CONTRAST: 7 mL Gadavist.   TECHNIQUE:  1) Routine multiplanar multisequence head MRI without and with intravenous contrast.  2) Dedicated high-resolution multiplanar multisequence MRI of the orbits without and with intravenous contrast.    FINDINGS:  INTRACRANIAL CONTENTS: No acute or subacute infarct. No mass, acute hemorrhage, or extra-axial fluid collections. Normal brain parenchymal signal. Normal ventricles and sulci. Normal position of the cerebellar tonsils. No pathologic contrast enhancement.    SELLA: No abnormality accounting for technique.    OSSEOUS STRUCTURES/SOFT TISSUES: Normal marrow signal. The major intracranial vascular flow voids are maintained.     ORBITS: Dedicated MRI of the orbits was performed. Asymmetric dilatation of the left optic nerve sheath with suggestion of peripheral enhancement (series 14 image 14). No definite enhancement or edema within the left optic nerve. Intact globes.   Symmetrical extraocular musculature without thickening/enlargement. The right intraorbital, canalicular and prechiasmatic optic nerve segments are normal in size and signal intensity bilaterally. The optic chiasm and proximal optic tracts are   unremarkable. No localized inflammation of the intraconal or extraconal orbital fat. Normal  lacrimal glands. No intraorbital mass.     SINUSES/MASTOIDS: Focal mucosal thickening in the right ethmoid air cells. No middle ear or mastoid effusion.       Impression    IMPRESSION:  HEAD MRI:  1.  No acute intracranial process. Specifically, no acute intracranial hemorrhage, focal mass lesion, or acute infarct.    ORBIT MRI:  1.  Asymmetric dilatation of the left optic nerve sheath with suggestion of peripheral enhancement. No definite edema or enhancement of the left optic nerve. Findings may reflect optic perineuritis (from an infectious or inflammatory etiology) versus   unilateral pseudotumor/idiopathic intracranial hypertension.  2.  Unremarkable appearance of the right orbit. Specifically, no abnormal dilatation of the optic nerve sheath or abnormal signal within the right optic nerve.    Findings were discussed with Dr. Arvizu at 3:16 AM on 07/19/2023.   MR Brain w/o & w Contrast    Narrative    EXAM: MR ORBIT W/O AND W CONTRAST  LOCATION: [facility]  DATE: 7/9/2023    INDICATION: Left optic nerve sheath larger than right on CT. MR recommended. Headache, vomiting, photophobia, vision changes.  COMPARISON: 07/08/2023.  CONTRAST: 7 mL Gadavist.   TECHNIQUE:  1) Routine multiplanar multisequence head MRI without and with intravenous contrast.  2) Dedicated high-resolution multiplanar multisequence MRI of the orbits without and with intravenous contrast.    FINDINGS:  INTRACRANIAL CONTENTS: No acute or subacute infarct. No mass, acute hemorrhage, or extra-axial fluid collections. Normal brain parenchymal signal. Normal ventricles and sulci. Normal position of the cerebellar tonsils. No pathologic contrast enhancement.    SELLA: No abnormality accounting for technique.    OSSEOUS STRUCTURES/SOFT TISSUES: Normal marrow signal. The major intracranial vascular flow voids are maintained.     ORBITS: Dedicated MRI of the orbits was performed. Asymmetric dilatation of the left optic nerve  sheath with suggestion of peripheral enhancement (series 14 image 14). No definite enhancement or edema within the left optic nerve. Intact globes.   Symmetrical extraocular musculature without thickening/enlargement. The right intraorbital, canalicular and prechiasmatic optic nerve segments are normal in size and signal intensity bilaterally. The optic chiasm and proximal optic tracts are   unremarkable. No localized inflammation of the intraconal or extraconal orbital fat. Normal lacrimal glands. No intraorbital mass.     SINUSES/MASTOIDS: Focal mucosal thickening in the right ethmoid air cells. No middle ear or mastoid effusion.       Impression    IMPRESSION:  HEAD MRI:  1.  No acute intracranial process. Specifically, no acute intracranial hemorrhage, focal mass lesion, or acute infarct.    ORBIT MRI:  1.  Asymmetric dilatation of the left optic nerve sheath with suggestion of peripheral enhancement. No definite edema or enhancement of the left optic nerve. Findings may reflect optic perineuritis (from an infectious or inflammatory etiology) versus   unilateral pseudotumor/idiopathic intracranial hypertension.  2.  Unremarkable appearance of the right orbit. Specifically, no abnormal dilatation of the optic nerve sheath or abnormal signal within the right optic nerve.    Findings were discussed with Dr. Arvizu at 3:16 AM on 07/19/2023.     Medications     lactated ringers 75 mL/hr at 07/09/23 0728       sodium chloride (PF)  3 mL Intracatheter Q8H

## 2023-07-09 NOTE — ED NOTES
"..  [facility]  ED Nurse Handoff Report    ED Chief complaint: Headache      ED Diagnosis:   Final diagnoses:   Optic neuritis, left   Nonintractable headache, unspecified chronicity pattern, unspecified headache type   Nausea and vomiting, unspecified vomiting type       Code Status: Full Code    Allergies: No Known Allergies    Patient Story: Headache started yesterday, taking home medications without relief. Vomiting. Took Zofran without relief. Doesn't normally get headaches. Was exposed to covid recently.     Focused Assessment:  AXO 4, RA, SBA, VSS, Pain management See EMAR    Treatments and/or interventions provided: N/A  Patient's response to treatments and/or interventions: WNL    To be done/followed up on inpatient unit:  N/A    Does this patient have any cognitive concerns?: None    Activity level - Baseline/Home:  Independent  Activity Level - Current:   Stand with Assist    Patient's Preferred language: English   Needed?: No    Isolation: None  Infection: Not Applicable  Patient tested for COVID 19 prior to admission: NO  Bariatric?: No    Vital Signs:   Vitals:    07/08/23 2150   BP: 118/83   Pulse: 88   Resp: 16   Temp: 97.8  F (36.6  C)   TempSrc: Temporal   SpO2: 97%   Weight: 74.8 kg (165 lb)   Height: 1.6 m (5' 3\")       Cardiac Rhythm:     Was the PSS-3 completed:   Yes  What interventions are required if any?                   For the majority of the shift this patient's behavior was Green.   Behavioral interventions performed were None.    ED NURSE PHONE NUMBER: *[phone]        "

## 2023-07-09 NOTE — ED TRIAGE NOTES
Headache started yesterday, taking home medications without relief. Vomiting. Took Zofran without relief. Doesn't normally get headaches. Was exposed to covid recently.

## 2023-07-09 NOTE — PHARMACY-ADMISSION MEDICATION HISTORY
Pharmacist Admission Medication History    Admission medication history is complete. The information provided in this note is only as accurate as the sources available at the time of the update.    Medication reconciliation/reorder completed by provider prior to medication history? No    Information Source(s): Patient and CareEverywhere/SureScripts via in-person      Changes made to PTA medication list:    Added: Prenatal, Imitrex, Zofran    Deleted: Ortho-cyclen, Nuvaring, IUD, (pt recently breastfeeding) Zyrtec    Changed: None       Allergies reviewed with patient and updates made in EHR: yes    Medication History Completed By: Colette Hagan 7/9/2023 7:47 AM    Prior to Admission medications    Medication Sig Last Dose Taking? Auth Provider Long Term End Date   albuterol (PROAIR HFA/PROVENTIL HFA/VENTOLIN HFA) 108 (90 BASE) MCG/ACT Inhaler Inhale 1-2 puffs into the lungs every 6 hours as needed prn med Yes Reported, Patient Yes    loratadine (CLARITIN) 10 MG tablet Take 1 tablet (10 mg) by mouth daily 7/8/2023 Yes Alicia Ruelas, BEN COBOS     ondansetron (ZOFRAN ODT) 4 MG ODT tab Take 4 mg by mouth every 8 hours as needed for nausea 7/8/2023 Yes Unknown, Entered By History     Prenatal Vit-Fe Fumarate-FA (PRENATAL MULTIVITAMIN W/IRON) 27-0.8 MG tablet Take 1 tablet by mouth daily 7/8/2023 Yes Unknown, Entered By History     SUMAtriptan (IMITREX) 50 MG tablet Take 50 mg by mouth at onset of headache for migraine 7/8/2023 Yes Unknown, Entered By History

## 2023-07-10 LAB
ACE SERPL-CCNC: 50 U/L
ANA SER QL IF: NEGATIVE
C GATTII+NEOFOR DNA CSF QL NAA+NON-PROBE: NEGATIVE
CMV DNA CSF QL NAA+NON-PROBE: NEGATIVE
E COLI K1 AG CSF QL: NEGATIVE
EV RNA SPEC QL NAA+PROBE: POSITIVE
GP B STREP DNA CSF QL NAA+NON-PROBE: NEGATIVE
HAEM INFLU DNA CSF QL NAA+NON-PROBE: NEGATIVE
HHV6 DNA CSF QL NAA+NON-PROBE: NEGATIVE
HSV1 DNA CSF QL NAA+NON-PROBE: NEGATIVE
HSV1 DNA CSF QL NAA+PROBE: NOT DETECTED
HSV2 DNA CSF QL NAA+NON-PROBE: NEGATIVE
HSV2 DNA CSF QL NAA+PROBE: NOT DETECTED
L MONOCYTOG DNA CSF QL NAA+NON-PROBE: NEGATIVE
N MEN DNA CSF QL NAA+NON-PROBE: NEGATIVE
PARECHOVIRUS A RNA CSF QL NAA+NON-PROBE: NEGATIVE
S PNEUM DNA CSF QL NAA+NON-PROBE: NEGATIVE
VZV DNA CSF QL NAA+NON-PROBE: NEGATIVE

## 2023-07-10 PROCEDURE — 258N000003 HC RX IP 258 OP 636: Performed by: INTERNAL MEDICINE

## 2023-07-10 PROCEDURE — 250N000011 HC RX IP 250 OP 636: Mod: JZ | Performed by: INTERNAL MEDICINE

## 2023-07-10 PROCEDURE — 99233 SBSQ HOSP IP/OBS HIGH 50: CPT | Performed by: INTERNAL MEDICINE

## 2023-07-10 PROCEDURE — 120N000001 HC R&B MED SURG/OB

## 2023-07-10 RX ADMIN — PROCHLORPERAZINE EDISYLATE 10 MG: 5 INJECTION INTRAMUSCULAR; INTRAVENOUS at 18:37

## 2023-07-10 RX ADMIN — HYDROMORPHONE HYDROCHLORIDE 0.4 MG: 0.2 INJECTION, SOLUTION INTRAMUSCULAR; INTRAVENOUS; SUBCUTANEOUS at 12:02

## 2023-07-10 RX ADMIN — ONDANSETRON 4 MG: 2 INJECTION INTRAMUSCULAR; INTRAVENOUS at 04:00

## 2023-07-10 RX ADMIN — HYDROMORPHONE HYDROCHLORIDE 0.4 MG: 0.2 INJECTION, SOLUTION INTRAMUSCULAR; INTRAVENOUS; SUBCUTANEOUS at 14:03

## 2023-07-10 RX ADMIN — HYDROMORPHONE HYDROCHLORIDE 0.4 MG: 0.2 INJECTION, SOLUTION INTRAMUSCULAR; INTRAVENOUS; SUBCUTANEOUS at 18:36

## 2023-07-10 RX ADMIN — SODIUM CHLORIDE, POTASSIUM CHLORIDE, SODIUM LACTATE AND CALCIUM CHLORIDE: 600; 310; 30; 20 INJECTION, SOLUTION INTRAVENOUS at 00:51

## 2023-07-10 RX ADMIN — HYDROMORPHONE HYDROCHLORIDE 0.4 MG: 0.2 INJECTION, SOLUTION INTRAMUSCULAR; INTRAVENOUS; SUBCUTANEOUS at 16:33

## 2023-07-10 RX ADMIN — SODIUM CHLORIDE, POTASSIUM CHLORIDE, SODIUM LACTATE AND CALCIUM CHLORIDE: 600; 310; 30; 20 INJECTION, SOLUTION INTRAVENOUS at 13:40

## 2023-07-10 RX ADMIN — PROCHLORPERAZINE EDISYLATE 10 MG: 5 INJECTION INTRAMUSCULAR; INTRAVENOUS at 07:25

## 2023-07-10 RX ADMIN — ONDANSETRON 4 MG: 4 TABLET, ORALLY DISINTEGRATING ORAL at 22:51

## 2023-07-10 RX ADMIN — PROCHLORPERAZINE EDISYLATE 10 MG: 5 INJECTION INTRAMUSCULAR; INTRAVENOUS at 00:34

## 2023-07-10 RX ADMIN — HYDROMORPHONE HYDROCHLORIDE 0.2 MG: 0.2 INJECTION, SOLUTION INTRAMUSCULAR; INTRAVENOUS; SUBCUTANEOUS at 20:48

## 2023-07-10 RX ADMIN — HYDROMORPHONE HYDROCHLORIDE 0.4 MG: 0.2 INJECTION, SOLUTION INTRAMUSCULAR; INTRAVENOUS; SUBCUTANEOUS at 04:09

## 2023-07-10 RX ADMIN — ONDANSETRON 4 MG: 2 INJECTION INTRAMUSCULAR; INTRAVENOUS at 16:38

## 2023-07-10 RX ADMIN — ONDANSETRON 4 MG: 2 INJECTION INTRAMUSCULAR; INTRAVENOUS at 10:09

## 2023-07-10 RX ADMIN — HYDROMORPHONE HYDROCHLORIDE 0.4 MG: 0.2 INJECTION, SOLUTION INTRAMUSCULAR; INTRAVENOUS; SUBCUTANEOUS at 07:25

## 2023-07-10 RX ADMIN — HYDROMORPHONE HYDROCHLORIDE 0.4 MG: 0.2 INJECTION, SOLUTION INTRAMUSCULAR; INTRAVENOUS; SUBCUTANEOUS at 00:34

## 2023-07-10 RX ADMIN — PROCHLORPERAZINE EDISYLATE 10 MG: 5 INJECTION INTRAMUSCULAR; INTRAVENOUS at 12:05

## 2023-07-10 RX ADMIN — HYDROMORPHONE HYDROCHLORIDE 0.4 MG: 0.2 INJECTION, SOLUTION INTRAMUSCULAR; INTRAVENOUS; SUBCUTANEOUS at 10:08

## 2023-07-10 RX ADMIN — HYDROMORPHONE HYDROCHLORIDE 0.4 MG: 0.2 INJECTION, SOLUTION INTRAMUSCULAR; INTRAVENOUS; SUBCUTANEOUS at 22:42

## 2023-07-10 ASSESSMENT — ACTIVITIES OF DAILY LIVING (ADL)
ADLS_ACUITY_SCORE: 18

## 2023-07-10 NOTE — PLAN OF CARE
Reason for Admission: Viral meningitis    Cognitive/Mentation: A/Ox 4  Neuros/CMS: Intact ex headache  VS: stable.   Tele: n/a.  GI: BS  active x4, passing flatus. Continent.  : Voiding adequate amounts. Continent.  Pulmonary: LS clear throughout.  Pain: reports headache, 6/10 given dilaudid q2h and compazine and zofran q6h.     Drains/Lines: PIV with LR at 100ml/h  Skin: intact  Activity: Up independently.  Diet: Clear liquids     Therapies recs: pending  Discharge: pending    Aggression Stoplight Tool: green

## 2023-07-10 NOTE — PROGRESS NOTES
Cross cover    Called that patient has enterovirus in her CSF taken from LP earlier today. Clinically patient is unchanged in terms of mentation and symptoms. In review of UpToDate there are no approved antivirals and treatment is generally supportive so I do not think there is any change to the plan. I did ask that RN let neurology know in case there was any change to plan from their perspective as she did have some changes on MRI suggestive of optic nerve disease.

## 2023-07-10 NOTE — PLAN OF CARE
Here with nausea/vomiting, headaches. LP completed last evening, positive for enterovirus in CSF fluid. Hospitalist and general neurology notified. A&Ox4. Neuros intact. VSS on RA. Not on tele. On clear liquid diet, thin liquids. Taking IV pain meds d/t nausea/vomiting. IV dilaudid effective for pain, IV zofran and compazine given as needed. LR infusing at 75ml/hr. Pt's IV in R AC became dislodged, new IV placed in R hand by anesthesia after multiple attempts by RNs. Up with SBA. Pt scoring green on the Aggression Stop Light Tool. Discharge/plan pending.

## 2023-07-10 NOTE — PROGRESS NOTES
"[facility]    Medicine Progress Note - Hospitalist Service        Date of Admission:  7/8/2023 10:28 PM    Assessment & Plan:   Regina Lawson is a 33 year old female with no significant past medical history who presents with headache, nausea, vomiting. Admitted on 7/8/2023.     Enterovirus meningitis  Abnormal left optic nerve on MRI  *Presents with progressive headache, nausea/vomiting  *MRI brain/orbit notable for asymmetric dilatation of left optic nerve sheath with no definite edema or enhancement, findings may suggest optic perineuritis versus unilateral pseudotumor/idiopathic intracranial hypertension  -Evaluated by neurology.  Lumbar puncture completed yesterday, preliminary results consistent with enterovirus meningitis.   -We will await reevaluation by neurology  -Most of other CSF studies pending at this point  -Infectious disease consult, although I do not believe there is any specific antiviral treatment for enterovirus meningitis  -Symptomatic treatment of pain and nausea    Diet: Combination Diet Clear Liquid     DVT Prophylaxis: Pneumatic Compression Devices   Solis Catheter: Not present  Code Status: Full Code     Disposition Plan       Expected Discharge Date: 07/12/2023              Entered: Mitchell Overton MD 07/10/2023, 10:45 AM        Clinically Significant Risk Factors                         # Overweight: Estimated body mass index is 29.23 kg/m  as calculated from the following:    Height as of this encounter: 1.6 m (5' 3\").    Weight as of this encounter: 74.8 kg (165 lb)., PRESENT ON ADMISSION             The patient's care was discussed with the Bedside Nurse and Patient.    Medical Decision Making       **CLEAR ALL SELECTIONS**        Labs/Imaging Reviewed:  See Information above and Data section below      Mitchell Overton MD  Hospitalist Service  [facility]  Text Page 7AM-6PM  Securely message with the Vocera Web Console (learn more " "here)  Text page via [facility] Paging/Directory    ______________________________________________________________________    Interval History   Still has headache, nausea is better although overall improved compared to yesterday.  Tmax of 100  F yesterday.  Discussed with patient and her family at the bedside regarding CSF findings and positive enterovirus on CSF. no focal tingling, numbness or weakness.  Denies visual changes.    Data reviewed today: I reviewed all medications, new labs and imaging results over the last 24 hours. I personally reviewed no images or EKG's today.    Physical Exam   Vital signs:  Temp: 98  F (36.7  C) Temp src: Oral BP: 111/75 Pulse: 69   Resp: 16 SpO2: 95 % O2 Device: None (Room air)   Height: 160 cm (5' 3\") Weight: 74.8 kg (165 lb)  Estimated body mass index is 29.23 kg/m  as calculated from the following:    Height as of this encounter: 1.6 m (5' 3\").    Weight as of this encounter: 74.8 kg (165 lb).      Wt Readings from Last 2 Encounters:   07/08/23 74.8 kg (165 lb)   01/26/18 75.6 kg (166 lb 11.2 oz)       Gen: AAOX3, NAD, photophobic  HEENT: Mild nuchal rigidity.  Resp: CTA B/L, normal WOB  CVS: RRR, no murmur  Abd/GI: Soft, non-tender. BS- normoactive.    Skin: Warm, dry no rashes  MSK: no pedal edema  Neuro- CN- intact. No focal deficits.        Data   Recent Labs   Lab 07/08/23  2242   WBC 8.2   HGB 13.7   MCV 90         POTASSIUM 3.8   CHLORIDE 104   CO2 22   BUN 6.1   CR 0.72   ANIONGAP 13   LAYA 8.9   *       Recent Results (from the past 24 hour(s))   XR Lumbar Puncture Spinal Tap Diag    Narrative    FLUOROSCOPICALLY-GUIDED LUMBAR PUNCTURE  7/9/2023 8:56 PM CDT    INDICATION: 33-year-old patient with headache and blurred vision. Pseudotumor cerebri. Lumbar puncture has been requested to obtain cerebrospinal fluid (CSF) for analysis.    CONSENT: The procedure and its indications, major risks, benefits, and alternatives were discussed. Risks include, but are " not limited to, pain, headache, hemorrhage, infection, nerve damage, spinal cord damage, and allergic reaction. Understanding was   acknowledged, and informed consent was obtained.    FLUOROSCOPIC TIME: 0.2 minutes    AIR KERMA: 2 mGy    DAP: 13.1 microGym2     NUMBER OF IMAGES: 1    ESTIMATED BLOOD LOSS: Trace    PROCEDURE: The patient was placed in prone position upon the fluoroscopic table. The skin of the back was prepped and draped in sterile fashion.  Using fluoroscopic guidance, the L4-L5 level was localized. The skin was infiltrated with 1% lidocaine.   Using direct fluoroscopic visualization, a 22 gauge spinal needle was advanced into the thecal sac at the L4-L5 level. An opening pressure measurement of 33 cm H20 was obtained. 20 mL of clear CSF was passively obtained in total. This was divided between   four labeled tubes. Closing pressure was 17 cm H2O. The needle was then removed. A dressing was applied. The procedure appeared well-tolerated and was without apparent complication.      Impression    CONCLUSION:   1.  Fluoroscopically-guided lumbar puncture yields 20 mL of clear cerebrospinal fluid  2.  An opening pressure of 33 cm H2O  3.  Closing pressure of 17 cm H2O     Medications     lactated ringers 75 mL/hr at 07/10/23 0051       sodium chloride (PF)  3 mL Intracatheter Q8H

## 2023-07-10 NOTE — PROGRESS NOTES
Pt here with viral meningitis workup. A&O X4. Neuros intact. VSS. Clear liquid diet, thin liquids- appetite improving slowly. Takes meds IV only due to nausea and vomiting. Up independent in room when family there.  Ramon currently in room. HA pain managed with IV dilaudid Q2- caught up to pain this shift and now manageable for Pt. LR running at 75/hr.Pt scoring green on the Aggression Stop Light Tool. Plan for consult with ID. Discharge home pending.

## 2023-07-10 NOTE — UTILIZATION REVIEW
"    Admission Status; Secondary Review Determination         Under the authority of the Utilization Management Committee, the utilization review process indicated a secondary review on the above patient.  The review outcome is based on review of the medical records, discussions with staff, and applying clinical experience noted on the date of the review.        (X)      Inpatient Status Appropriate - This patient's medical care is consistent with medical management for inpatient care and reasonable inpatient medical practice.      () Observation Status Appropriate - This patient does not meet hospital inpatient criteria and is placed in observation status. If this patient's primary payer is Medicare and was admitted as an inpatient, Condition Code 44 should be used and patient status changed to \"observation\".   () Admission Status NOT Appropriate - This patient's medical care is not consistent with medical management for Inpatient or Observation Status.          RATIONALE FOR DETERMINATION     \"Regina Lawson is a 33 year old female with no significant past medical history who presents with headache, nausea, vomiting. Admitted on 7/8/2023.\"    The patient is noted to have enterovirus meningitis and is requiring frequent doses of IV dilaudid.    Given ongoing frequent IV dilaudid, the patient is likely to stay > 2 MN and inpatient status is appropriate.      The severity of illness, intensity of service provided, expected LOS and risk for adverse outcome make the care complex, high risk and appropriate for hospital admission.        The information on this document is developed by the utilization review team in order for the business office to ensure compliance.  This only denotes the appropriateness of proper admission status and does not reflect the quality of care rendered.         The definitions of Inpatient Status and Observation Status used in making the determination above are those provided in the CMS " Coverage Manual, Chapter 1 and Chapter 6, section 70.4.      Sincerely,     ALYSE RICHARD MD    Physician Advisor  Utilization Review/ Case Management  [facility].

## 2023-07-10 NOTE — PROGRESS NOTES
Paged hospitalist, Dr. Villagomez for critical lab result    [phone]    Critical lab result  - pt positive for enterovirus from the meningitis encephalitis CSF testing    Gay, [phone]    Addendum: paged neurologist per hospitalist request

## 2023-07-10 NOTE — PROCEDURES
Neurointerventional Surgery  Post-procedure note    Procedure: fluoro guided lumbar puncture    Radiologist: Devang Abebe MD    Fluoro Time: .2 minutes  Air Kerma: 2 mGy  DAP: 13.1 microGym2  Number of images: one     EBL: minimal  Complications: none    Preliminary findings: (see dictation for full detail)  LP at L4-5 yields 20 ml clear CSF  Opening pressure 33 cm H2O  Closing pressure 17 cm H2O    Assess/Plan:   Samples to lab  Bedrest for two hours    Devang Abebe MD  Pager: [phone]  Emergency pager: [phone]  Office: [phone]

## 2023-07-11 VITALS
HEART RATE: 65 BPM | DIASTOLIC BLOOD PRESSURE: 76 MMHG | OXYGEN SATURATION: 95 % | TEMPERATURE: 98.8 F | BODY MASS INDEX: 29.23 KG/M2 | SYSTOLIC BLOOD PRESSURE: 120 MMHG | HEIGHT: 63 IN | WEIGHT: 165 LBS | RESPIRATION RATE: 16 BRPM

## 2023-07-11 LAB
PATH REPORT.COMMENTS IMP SPEC: ABNORMAL
PATH REPORT.COMMENTS IMP SPEC: YES
PATH REPORT.FINAL DX SPEC: ABNORMAL
PATH REPORT.GROSS SPEC: ABNORMAL
PATH REPORT.MICROSCOPIC SPEC OTHER STN: ABNORMAL
PATH REPORT.RELEVANT HX SPEC: ABNORMAL
WNV IGG CSF IA-ACNC: 0.22 IV
WNV IGM CSF IA-ACNC: 0 IV

## 2023-07-11 PROCEDURE — 250N000011 HC RX IP 250 OP 636: Mod: JZ | Performed by: INTERNAL MEDICINE

## 2023-07-11 PROCEDURE — 258N000003 HC RX IP 258 OP 636: Performed by: INTERNAL MEDICINE

## 2023-07-11 PROCEDURE — 250N000013 HC RX MED GY IP 250 OP 250 PS 637: Performed by: INTERNAL MEDICINE

## 2023-07-11 PROCEDURE — 99222 1ST HOSP IP/OBS MODERATE 55: CPT | Performed by: SPECIALIST

## 2023-07-11 PROCEDURE — 99232 SBSQ HOSP IP/OBS MODERATE 35: CPT | Performed by: INTERNAL MEDICINE

## 2023-07-11 PROCEDURE — 88108 CYTOPATH CONCENTRATE TECH: CPT | Mod: 26 | Performed by: PATHOLOGY

## 2023-07-11 RX ORDER — ACETAMINOPHEN 325 MG/1
650 TABLET ORAL EVERY 6 HOURS PRN
DISCHARGE
Start: 2023-07-11

## 2023-07-11 RX ORDER — PRENATAL VIT/IRON FUM/FOLIC AC 27MG-0.8MG
1 TABLET ORAL DAILY
Status: DISCONTINUED | OUTPATIENT
Start: 2023-07-11 | End: 2023-07-11 | Stop reason: HOSPADM

## 2023-07-11 RX ORDER — OXYCODONE HYDROCHLORIDE 5 MG/1
5 TABLET ORAL EVERY 4 HOURS PRN
Status: DISCONTINUED | OUTPATIENT
Start: 2023-07-11 | End: 2023-07-11 | Stop reason: HOSPADM

## 2023-07-11 RX ORDER — ALBUTEROL SULFATE 90 UG/1
1-2 AEROSOL, METERED RESPIRATORY (INHALATION) EVERY 6 HOURS PRN
Status: DISCONTINUED | OUTPATIENT
Start: 2023-07-11 | End: 2023-07-11 | Stop reason: HOSPADM

## 2023-07-11 RX ORDER — ONDANSETRON 4 MG/1
4 TABLET, ORALLY DISINTEGRATING ORAL EVERY 8 HOURS PRN
Qty: 15 TABLET | Refills: 0 | Status: SHIPPED | OUTPATIENT
Start: 2023-07-11

## 2023-07-11 RX ORDER — OXYCODONE HYDROCHLORIDE 5 MG/1
5 TABLET ORAL EVERY 6 HOURS PRN
Qty: 12 TABLET | Refills: 0 | Status: SHIPPED | OUTPATIENT
Start: 2023-07-11 | End: 2023-07-14

## 2023-07-11 RX ORDER — BUTALBITAL, ACETAMINOPHEN AND CAFFEINE 50; 325; 40 MG/1; MG/1; MG/1
1 TABLET ORAL EVERY 4 HOURS PRN
Status: DISCONTINUED | OUTPATIENT
Start: 2023-07-11 | End: 2023-07-11 | Stop reason: HOSPADM

## 2023-07-11 RX ADMIN — ACETAMINOPHEN 650 MG: 325 TABLET, FILM COATED ORAL at 08:10

## 2023-07-11 RX ADMIN — ACETAMINOPHEN 650 MG: 325 TABLET, FILM COATED ORAL at 16:03

## 2023-07-11 RX ADMIN — ONDANSETRON 4 MG: 4 TABLET, ORALLY DISINTEGRATING ORAL at 04:53

## 2023-07-11 RX ADMIN — SODIUM CHLORIDE, POTASSIUM CHLORIDE, SODIUM LACTATE AND CALCIUM CHLORIDE: 600; 310; 30; 20 INJECTION, SOLUTION INTRAVENOUS at 01:51

## 2023-07-11 RX ADMIN — PROCHLORPERAZINE EDISYLATE 10 MG: 5 INJECTION INTRAMUSCULAR; INTRAVENOUS at 03:17

## 2023-07-11 RX ADMIN — HYDROMORPHONE HYDROCHLORIDE 0.4 MG: 0.2 INJECTION, SOLUTION INTRAMUSCULAR; INTRAVENOUS; SUBCUTANEOUS at 03:07

## 2023-07-11 ASSESSMENT — ACTIVITIES OF DAILY LIVING (ADL)
ADLS_ACUITY_SCORE: 18

## 2023-07-11 NOTE — PLAN OF CARE
Summary: [id number] 7/10/23 viral meningitis   Orientation: A/Ox4   Activity Level: stand by/ ind  Fall Risk: yes  Behavior & Aggression Tool Color: green  Pain Management: PRN Dilaudid given for head and neck pain  ABNL VS/O2: VSS on RA  ABNL Lab/BG: n/a  Diet: clear liquid  Bowel/Bladder: continent   Drains/Devices: R PIV infusing LR 75 mL/hr  Tests/Procedures for next shift: n/a  Anticipated DC date: TBD  Other Important Info:

## 2023-07-11 NOTE — DISCHARGE INSTRUCTIONS
If you have any questions or concerns, please call Dr. Tellez at [facility]: [phone]  -Follow-up with neuro ophthalmology, Dr. Tellez will arrange.

## 2023-07-11 NOTE — PROGRESS NOTES
"[facility]    Medicine Progress Note - Hospitalist Service        Date of Admission:  7/8/2023 10:28 PM    Assessment & Plan:   Regina Lawson is a 33 year old female with no significant past medical history who presents with headache, nausea, vomiting. Admitted on 7/8/2023.     Enterovirus meningitis  Abnormal left optic nerve on MRI  *Presents with progressive headache, nausea/vomiting  *MRI brain/orbit notable for asymmetric dilatation of left optic nerve sheath with no definite edema or enhancement, findings may suggest optic perineuritis versus unilateral pseudotumor/idiopathic intracranial hypertension  -Evaluated by neurology.  Lumbar puncture completed, preliminary results consistent with enterovirus meningitis.   -We will await reevaluation by neurology regarding abnormal left optic nerve finding on MRI and whether or not steroids would be indicated  -Most of other CSF studies pending at this point  -Appreciate infectious disease evaluation, no antimicrobial treatment is indicated, aseptic meningitis is self-limiting.  -Symptomatic treatment of pain and nausea  -If no steroid planned and clinically improving, possible discharge later this evening or tomorrow morning.    Diet: Regular Diet Adult     DVT Prophylaxis: Pneumatic Compression Devices   Solis Catheter: Not present  Code Status: Full Code     Disposition Plan       Expected Discharge Date: 07/12/2023              Entered: Mitchell Overton MD 07/11/2023, 11:36 AM        Clinically Significant Risk Factors                         # Overweight: Estimated body mass index is 29.23 kg/m  as calculated from the following:    Height as of this encounter: 1.6 m (5' 3\").    Weight as of this encounter: 74.8 kg (165 lb)., PRESENT ON ADMISSION             The patient's care was discussed with the Bedside Nurse and Patient.    Medical Decision Making       **CLEAR ALL SELECTIONS**        Labs/Imaging Reviewed:  See Information above and " "Data section below      Mitchell Overton MD  Hospitalist Service  [facility]  Text Page 7AM-6PM  Securely message with the [employer] Web Console (learn more here)  Text page via [employer] Paging/Directory    ______________________________________________________________________    Interval History   Afebrile.  Still has intermittent headache however feeling much better.  She wanted diet advanced to regular diet.  Awaiting evaluation by neurology.  Nausea is better.    Data reviewed today: I reviewed all medications, new labs and imaging results over the last 24 hours. I personally reviewed no images or EKG's today.    Physical Exam   Vital signs:  Temp: 98.5  F (36.9  C) Temp src: Oral BP: 119/71 Pulse: 61   Resp: 18 SpO2: 100 % O2 Device: None (Room air)   Height: 160 cm (5' 3\") Weight: 74.8 kg (165 lb)  Estimated body mass index is 29.23 kg/m  as calculated from the following:    Height as of this encounter: 1.6 m (5' 3\").    Weight as of this encounter: 74.8 kg (165 lb).      Wt Readings from Last 2 Encounters:   07/08/23 74.8 kg (165 lb)   01/26/18 75.6 kg (166 lb 11.2 oz)       Gen: AAOX3, NAD, photophobic  HEENT: Mild nuchal rigidity.  Resp: CTA B/L, normal WOB  CVS: RRR, no murmur  Abd/GI: Soft, non-tender. BS- normoactive.    Skin: Warm, dry no rashes  MSK: no pedal edema  Neuro- CN- intact. No focal deficits.        Data   Recent Labs   Lab 07/08/23  2242   WBC 8.2   HGB 13.7   MCV 90         POTASSIUM 3.8   CHLORIDE 104   CO2 22   BUN 6.1   CR 0.72   ANIONGAP 13   LAYA 8.9   *       No results found for this or any previous visit (from the past 24 hour(s)).  Medications       sodium chloride (PF)  3 mL Intracatheter Q8H             "

## 2023-07-11 NOTE — PLAN OF CARE
Here for viral meningitis. A&O x4.  Neuros intact x mild headache. Pain tolerable with tylenol. VSS on RA. Up independently. Tolerating regular diet without complaints of N/V. Takes pills whole. AVS and Rx reviewed with patient. Discharge to home w spouse.

## 2023-07-11 NOTE — PROGRESS NOTES
[facility]    Neurology Progress Note     Assessment & Plan   Regina Lawson is a 33 year old female who was admitted on 7/8/2023.  The patient is doing better no further headaches.  No ocular symptoms.  Questionable left optic very neuritis.  At this time I recommend    -Follow-up with neuro ophthalmology, I will arrange for the patient follow-up myself.  -Please give her my name and [facility], [facility] in [place] number for the patient to call if any questions arise      Paige Tellez MD    Interval History   Patient admitted for severe headache, found to have enterovirus associated  meningitis.  The patient feels better, the headaches is significantly improved.  The patient denies any blurry vision or double vision.    The patient has had an MRI of the head and orbit which showed a symmetric dilatation of the left optic nerve with suggestion of peripheral enhancement.  No definite edema or enhancement of the left optic nerve.  Questionable perineuritis from mild infectious or inflammatory etiology versus unilateral episode or 2 more intracranial hypertension.  The right optic nerve is normal.  MRI of the brain is otherwise normal.    Physical Exam   Temp: 98.8  F (37.1  C) Temp src: Oral BP: 120/76 Pulse: 65   Resp: 16 SpO2: 95 % O2 Device: None (Room air)    Vitals:    07/08/23 2150   Weight: 74.8 kg (165 lb)     Vital Signs with Ranges  Temp:  [98  F (36.7  C)-98.8  F (37.1  C)] 98.8  F (37.1  C)  Pulse:  [55-69] 65  Resp:  [16-18] 16  BP: (110-120)/(60-76) 120/76  SpO2:  [95 %-100 %] 95 %  I/O last 3 completed shifts:  In: 525 [I.V.:525]  Out: -         Medications       prenatal multivitamin w/iron  1 tablet Oral Daily     sodium chloride (PF)  3 mL Intracatheter Q8H       Data   No results found for this or any previous visit (from the past 24 hour(s)).     CSF shows white blood cells of 124, red blood cell 13, positive enterovirus, ACE normal

## 2023-07-11 NOTE — PLAN OF CARE
Reason for Admission: viral meningitis    Cognitive/Mentation: A/Ox4    Neuros/CMS: Intact ex headache    VS: VSS on RA    GI: BS normoactive X 4 quadrants, passing flatus, continent.    : continent.    Pulmonary: LS clear.    Pain: to head; hydromorphone X 1    Drains/Lines: LR @ 75 ml to RUE    Skin: WDL    Activity: SBA    Diet: clear liquid diet with thin liquids. Takes pills whole.     Therapies recs: pending    Discharge: pending    Aggression Stoplight Tool: green    End of shift summary: IV compazine X 1, Oral Ondansetron ODT X 1

## 2023-07-11 NOTE — DISCHARGE SUMMARY
[facility]    Discharge Summary  Hospitalist    Date of Admission:  7/8/2023  Date of Discharge:  7/11/2023  Discharging Provider: Mitchell Overton MD, MD    Discharge Diagnoses        Enterovirus meningitis  Abnormal left optic nerve on MRI      Hospital Course:    Regina Lawson is a 33 year old female with no significant past medical history who presents with headache, nausea, vomiting. Admitted on 7/8/2023.      Enterovirus meningitis  Abnormal left optic nerve on MRI  *Presents with progressive headache, nausea/vomiting  *MRI brain/orbit notable for asymmetric dilatation of left optic nerve sheath with no definite edema or enhancement, findings may suggest optic perineuritis versus unilateral pseudotumor/idiopathic intracranial hypertension  -Evaluated by neurology.  Lumbar puncture completed, preliminary results consistent with enterovirus meningitis.   -Most of other CSF studies pending at this point   -Her presentation very well could be due to enterovirus meningitis alone however given the abnormal MRI plan is to follow-up outpatient with neuro-ophthalmologist.  -Appreciate infectious disease evaluation, no antimicrobial treatment is indicated, aseptic meningitis is self-limiting.  -Symptomatic treatment of pain and nausea  -Also discussed with Dr.Banica Leon, she recommends follow-up outpatient with neuro-ophthalmology.  She will arrange for the patient to follow-up  -Patient symptomatically much better, wants to discharge home.  Other than headache and nausea she does not have other neurological symptoms.  Specifically no vision symptoms.  -I will give her a prescription of oxycodone and Zofran just in case she has ongoing headaches.    Mitchell Overton MD, MD    Significant Results and Procedures   See below    Pending Results     Unresulted Labs Ordered in the Past 30 Days of this Admission     Date and Time Order Name Status Description    7/9/2023 11:32 AM  Oligoclonal Banding: In process     7/9/2023 11:32 AM Angiotensin Converting Enzyme CSF: In process     7/9/2023 11:32 AM West Nile Virus IgG and IgM CSF: In process     7/9/2023 11:32 AM VDRL CSF: In process     7/9/2023 11:32 AM Oligoclonal banding CSF and Blood In process     7/9/2023 11:32 AM Cerebrospinal fluid Aerobic Bacterial Culture Routine Preliminary           Code Status   Full Code       Primary Care Physician   Opal Gardner    Physical Exam   Temp: 98.6  F (37  C) Temp src: Oral BP: 114/71 Pulse: 69   Resp: 16 SpO2: 97 % O2 Device: None (Room air)      Pls see progress note from earlier    Discharge Disposition   Discharged to home  Condition at discharge: Stable    Consultations This Hospital Stay   NEUROLOGY IP CONSULT  INFECTIOUS DISEASES IP CONSULT    Time Spent on this Encounter   IMitchell MD, personally saw the patient today and spent greater than 30 minutes discharging this patient.    Discharge Orders      Reason for your hospital stay    Aseptic meningitis     Follow-up and recommended labs and tests     Follow up with primary care provider, Opal Gardner, within 7 days for hospital follow- up.     Activity    Your activity upon discharge: activity as tolerated     Diet    Follow this diet upon discharge: Orders Placed This Encounter      Regular Diet Adult     Discharge Medications   Current Discharge Medication List      START taking these medications    Details   acetaminophen (TYLENOL) 325 MG tablet Take 2 tablets (650 mg) by mouth every 6 hours as needed for mild pain or other (and adjunct with moderate or severe pain or per patient request)    Associated Diagnoses: Aseptic meningitis      oxyCODONE (ROXICODONE) 5 MG tablet Take 1 tablet (5 mg) by mouth every 6 hours as needed for pain  Qty: 12 tablet, Refills: 0    Associated Diagnoses: Aseptic meningitis         CONTINUE these medications which have CHANGED    Details   ondansetron (ZOFRAN ODT) 4 MG ODT tab Take 1  tablet (4 mg) by mouth every 8 hours as needed for nausea  Qty: 15 tablet, Refills: 0    Associated Diagnoses: Aseptic meningitis         CONTINUE these medications which have NOT CHANGED    Details   albuterol (PROAIR HFA/PROVENTIL HFA/VENTOLIN HFA) 108 (90 BASE) MCG/ACT Inhaler Inhale 1-2 puffs into the lungs every 6 hours as needed      loratadine (CLARITIN) 10 MG tablet Take 1 tablet (10 mg) by mouth daily  Qty: 30 tablet, Refills: 1    Associated Diagnoses: Perennial allergic rhinitis      Prenatal Vit-Fe Fumarate-FA (PRENATAL MULTIVITAMIN W/IRON) 27-0.8 MG tablet Take 1 tablet by mouth daily         STOP taking these medications       SUMAtriptan (IMITREX) 50 MG tablet Comments:   Reason for Stopping:             Allergies   No Known Allergies  Data   Most Recent 3 CBC's:  Recent Labs   Lab Test 07/08/23  2242   WBC 8.2   HGB 13.7   MCV 90         Most Recent 3 BMP's:  Recent Labs   Lab Test 07/08/23  2242      POTASSIUM 3.8   CHLORIDE 104   CO2 22   BUN 6.1   CR 0.72   ANIONGAP 13   LAYA 8.9   *     Most Recent 2 LFT's:No lab results found.  Most Recent INR's and Anticoagulation Dosing History:  Anticoagulation Dose History         No data to display              Most Recent 3 Troponin's:No lab results found.  Most Recent Cholesterol Panel:No lab results found.  Most Recent 6 Bacteria Isolates From Any Culture (See EPIC Reports for Culture Details):No lab results found.  Most Recent TSH, T4 and A1c Labs:No lab results found.    Results for orders placed or performed during the hospital encounter of 07/08/23   Head CT w/o contrast    Narrative    EXAM: CTA HEAD NECK W CONTRAST, CTV HEAD WITH CONTRAST, CT HEAD W/O CONTRAST  LOCATION: [facility]  DATE: 7/8/2023    INDICATION: Headache, vomiting, increased pain when lying down, aggravated by Valsalva, vision changes, photophobia  COMPARISON: None.  CONTRAST: 75 mL Isovue 370 (accession [id number]), 0 mL Isovue 370  (accession [id number]), 75 mL Isovue 370 (accession [id number])  TECHNIQUE:   1) Head and neck CT angiogram with IV contrast. Noncontrast head CT followed by axial helical CT images of the head and neck vessels obtained during the arterial phase of intravenous contrast administration. Axial 2D reconstructed images and multiplanar   3D MIP reconstructed images of the head and neck vessels were performed by the technologist. Dose reduction techniques were used. All stenosis measurements made according to NASCET criteria unless otherwise specified.  2) Head CT venogram with IV contrast. Axial helical CT images of the intracranial vessels obtained during the venous phase of intravenous contrast administration. Axial helical 2D reconstructed images and multiplanar 3D MIP reconstructed images of the   intracranial vessels were performed by the technologist. Dose reduction techniques were used.     FINDINGS:   NONCONTRAST HEAD CT:   INTRACRANIAL CONTENTS: No intracranial hemorrhage, extraaxial collection, or mass effect.  No CT evidence of acute infarct. Normal parenchymal attenuation. Normal ventricles and sulci.     VISUALIZED ORBITS/SINUSES/MASTOIDS: Incompletely evaluated expansion of the left greater than right optic nerves/sheaths. Focal mucosal thickening in the right ethmoid air cells. No middle ear or mastoid effusion.    BONES/SOFT TISSUES: No acute abnormality.    HEAD CTA:  ANTERIOR CIRCULATION: No stenosis/occlusion, aneurysm, or high flow vascular malformation. Fetal origin of the right posterior cerebral artery from the anterior circulation. Asymmetrically smaller right A1 segment with a patent anterior communicating   artery.    POSTERIOR CIRCULATION: No stenosis/occlusion, aneurysm, or high flow vascular malformation. Balanced vertebral arteries supply a normal basilar artery.     HEAD CTV:  DURAL SINUSES: No significant stenosis or occlusion. Dominant right and smaller left transverse and sigmoid dural  sinuses.    INTERNAL CEREBRAL VEINS: No significant stenosis or occlusion.      MAJOR CORTICAL VENOUS BRANCHES: No significant stenosis or occlusion.    NECK CTA:  RIGHT CAROTID: No measurable stenosis or dissection.    LEFT CAROTID: No measurable stenosis or dissection.    VERTEBRAL ARTERIES: No focal stenosis or dissection. Balanced vertebral arteries.    AORTIC ARCH: Classic aortic arch anatomy with no significant stenosis at the origin of the great vessels.    NONVASCULAR STRUCTURES: No high-grade osseous spinal canal or neural foraminal narrowing. Visualized portions of the lungs are clear.      Impression    IMPRESSION:   HEAD CT:  1.  No acute intracranial process.  2.  Asymmetric expansion of the left greater than right optic nerve/sheaths. Findings are nonspecific and further evaluation with dedicated orbit MRI with and without IV contrast is recommended to better evaluate for an underlying lesion.    HEAD CTA:   1.  No significant stenosis, aneurysm, or high flow vascular malformation identified.  2.  Variant [place] anatomy as above.    HEAD CTV:   1.  No intracranial venous thrombosis or stenosis.    NECK CTA:  1.  No hemodynamically significant stenosis in the neck vessels.   2.  No evidence for dissection.   CTA Head Neck with Contrast    Narrative    EXAM: CTA HEAD NECK W CONTRAST, CTV HEAD WITH CONTRAST, CT HEAD W/O CONTRAST  LOCATION: [facility]  DATE: 7/8/2023    INDICATION: Headache, vomiting, increased pain when lying down, aggravated by Valsalva, vision changes, photophobia  COMPARISON: None.  CONTRAST: 75 mL Isovue 370 (accession [id number]), 0 mL Isovue 370 (accession [id number]), 75 mL Isovue 370 (accession [id number])  TECHNIQUE:   1) Head and neck CT angiogram with IV contrast. Noncontrast head CT followed by axial helical CT images of the head and neck vessels obtained during the arterial phase of intravenous contrast administration. Axial 2D reconstructed  images and multiplanar   3D MIP reconstructed images of the head and neck vessels were performed by the technologist. Dose reduction techniques were used. All stenosis measurements made according to NASCET criteria unless otherwise specified.  2) Head CT venogram with IV contrast. Axial helical CT images of the intracranial vessels obtained during the venous phase of intravenous contrast administration. Axial helical 2D reconstructed images and multiplanar 3D MIP reconstructed images of the   intracranial vessels were performed by the technologist. Dose reduction techniques were used.     FINDINGS:   NONCONTRAST HEAD CT:   INTRACRANIAL CONTENTS: No intracranial hemorrhage, extraaxial collection, or mass effect.  No CT evidence of acute infarct. Normal parenchymal attenuation. Normal ventricles and sulci.     VISUALIZED ORBITS/SINUSES/MASTOIDS: Incompletely evaluated expansion of the left greater than right optic nerves/sheaths. Focal mucosal thickening in the right ethmoid air cells. No middle ear or mastoid effusion.    BONES/SOFT TISSUES: No acute abnormality.    HEAD CTA:  ANTERIOR CIRCULATION: No stenosis/occlusion, aneurysm, or high flow vascular malformation. Fetal origin of the right posterior cerebral artery from the anterior circulation. Asymmetrically smaller right A1 segment with a patent anterior communicating   artery.    POSTERIOR CIRCULATION: No stenosis/occlusion, aneurysm, or high flow vascular malformation. Balanced vertebral arteries supply a normal basilar artery.     HEAD CTV:  DURAL SINUSES: No significant stenosis or occlusion. Dominant right and smaller left transverse and sigmoid dural sinuses.    INTERNAL CEREBRAL VEINS: No significant stenosis or occlusion.      MAJOR CORTICAL VENOUS BRANCHES: No significant stenosis or occlusion.    NECK CTA:  RIGHT CAROTID: No measurable stenosis or dissection.    LEFT CAROTID: No measurable stenosis or dissection.    VERTEBRAL ARTERIES: No focal  stenosis or dissection. Balanced vertebral arteries.    AORTIC ARCH: Classic aortic arch anatomy with no significant stenosis at the origin of the great vessels.    NONVASCULAR STRUCTURES: No high-grade osseous spinal canal or neural foraminal narrowing. Visualized portions of the lungs are clear.      Impression    IMPRESSION:   HEAD CT:  1.  No acute intracranial process.  2.  Asymmetric expansion of the left greater than right optic nerve/sheaths. Findings are nonspecific and further evaluation with dedicated orbit MRI with and without IV contrast is recommended to better evaluate for an underlying lesion.    HEAD CTA:   1.  No significant stenosis, aneurysm, or high flow vascular malformation identified.  2.  Variant [place] of [place] anatomy as above.    HEAD CTV:   1.  No intracranial venous thrombosis or stenosis.    NECK CTA:  1.  No hemodynamically significant stenosis in the neck vessels.   2.  No evidence for dissection.   CTV Head with Contrast    Narrative    EXAM: CTA HEAD NECK W CONTRAST, CTV HEAD WITH CONTRAST, CT HEAD W/O CONTRAST  LOCATION: [facility]  DATE: 7/8/2023    INDICATION: Headache, vomiting, increased pain when lying down, aggravated by Valsalva, vision changes, photophobia  COMPARISON: None.  CONTRAST: 75 mL Isovue 370 (accession [id number]), 0 mL Isovue 370 (accession [id number]), 75 mL Isovue 370 (accession [id number])  TECHNIQUE:   1) Head and neck CT angiogram with IV contrast. Noncontrast head CT followed by axial helical CT images of the head and neck vessels obtained during the arterial phase of intravenous contrast administration. Axial 2D reconstructed images and multiplanar   3D MIP reconstructed images of the head and neck vessels were performed by the technologist. Dose reduction techniques were used. All stenosis measurements made according to NASCET criteria unless otherwise specified.  2) Head CT venogram with IV contrast. Axial helical CT images of  the intracranial vessels obtained during the venous phase of intravenous contrast administration. Axial helical 2D reconstructed images and multiplanar 3D MIP reconstructed images of the   intracranial vessels were performed by the technologist. Dose reduction techniques were used.     FINDINGS:   NONCONTRAST HEAD CT:   INTRACRANIAL CONTENTS: No intracranial hemorrhage, extraaxial collection, or mass effect.  No CT evidence of acute infarct. Normal parenchymal attenuation. Normal ventricles and sulci.     VISUALIZED ORBITS/SINUSES/MASTOIDS: Incompletely evaluated expansion of the left greater than right optic nerves/sheaths. Focal mucosal thickening in the right ethmoid air cells. No middle ear or mastoid effusion.    BONES/SOFT TISSUES: No acute abnormality.    HEAD CTA:  ANTERIOR CIRCULATION: No stenosis/occlusion, aneurysm, or high flow vascular malformation. Fetal origin of the right posterior cerebral artery from the anterior circulation. Asymmetrically smaller right A1 segment with a patent anterior communicating   artery.    POSTERIOR CIRCULATION: No stenosis/occlusion, aneurysm, or high flow vascular malformation. Balanced vertebral arteries supply a normal basilar artery.     HEAD CTV:  DURAL SINUSES: No significant stenosis or occlusion. Dominant right and smaller left transverse and sigmoid dural sinuses.    INTERNAL CEREBRAL VEINS: No significant stenosis or occlusion.      MAJOR CORTICAL VENOUS BRANCHES: No significant stenosis or occlusion.    NECK CTA:  RIGHT CAROTID: No measurable stenosis or dissection.    LEFT CAROTID: No measurable stenosis or dissection.    VERTEBRAL ARTERIES: No focal stenosis or dissection. Balanced vertebral arteries.    AORTIC ARCH: Classic aortic arch anatomy with no significant stenosis at the origin of the great vessels.    NONVASCULAR STRUCTURES: No high-grade osseous spinal canal or neural foraminal narrowing. Visualized portions of the lungs are clear.      Impression     IMPRESSION:   HEAD CT:  1.  No acute intracranial process.  2.  Asymmetric expansion of the left greater than right optic nerve/sheaths. Findings are nonspecific and further evaluation with dedicated orbit MRI with and without IV contrast is recommended to better evaluate for an underlying lesion.    HEAD CTA:   1.  No significant stenosis, aneurysm, or high flow vascular malformation identified.  2.  Variant [place] of [place] anatomy as above.    HEAD CTV:   1.  No intracranial venous thrombosis or stenosis.    NECK CTA:  1.  No hemodynamically significant stenosis in the neck vessels.   2.  No evidence for dissection.   MR Orbit w/o & w Contrast    Narrative    EXAM: MR ORBIT W/O AND W CONTRAST  LOCATION: [facility]  DATE: 7/9/2023    INDICATION: Left optic nerve sheath larger than right on CT. MR recommended. Headache, vomiting, photophobia, vision changes.  COMPARISON: 07/08/2023.  CONTRAST: 7 mL Gadavist.   TECHNIQUE:  1) Routine multiplanar multisequence head MRI without and with intravenous contrast.  2) Dedicated high-resolution multiplanar multisequence MRI of the orbits without and with intravenous contrast.    FINDINGS:  INTRACRANIAL CONTENTS: No acute or subacute infarct. No mass, acute hemorrhage, or extra-axial fluid collections. Normal brain parenchymal signal. Normal ventricles and sulci. Normal position of the cerebellar tonsils. No pathologic contrast enhancement.    SELLA: No abnormality accounting for technique.    OSSEOUS STRUCTURES/SOFT TISSUES: Normal marrow signal. The major intracranial vascular flow voids are maintained.     ORBITS: Dedicated MRI of the orbits was performed. Asymmetric dilatation of the left optic nerve sheath with suggestion of peripheral enhancement (series 14 image 14). No definite enhancement or edema within the left optic nerve. Intact globes.   Symmetrical extraocular musculature without thickening/enlargement. The right intraorbital,  canalicular and prechiasmatic optic nerve segments are normal in size and signal intensity bilaterally. The optic chiasm and proximal optic tracts are   unremarkable. No localized inflammation of the intraconal or extraconal orbital fat. Normal lacrimal glands. No intraorbital mass.     SINUSES/MASTOIDS: Focal mucosal thickening in the right ethmoid air cells. No middle ear or mastoid effusion.       Impression    IMPRESSION:  HEAD MRI:  1.  No acute intracranial process. Specifically, no acute intracranial hemorrhage, focal mass lesion, or acute infarct.    ORBIT MRI:  1.  Asymmetric dilatation of the left optic nerve sheath with suggestion of peripheral enhancement. No definite edema or enhancement of the left optic nerve. Findings may reflect optic perineuritis (from an infectious or inflammatory etiology) versus   unilateral pseudotumor/idiopathic intracranial hypertension.  2.  Unremarkable appearance of the right orbit. Specifically, no abnormal dilatation of the optic nerve sheath or abnormal signal within the right optic nerve.    Findings were discussed with Dr. Arvizu at 3:16 AM on 07/19/2023.   MR Brain w/o & w Contrast    Narrative    EXAM: MR ORBIT W/O AND W CONTRAST  LOCATION: [facility]  DATE: 7/9/2023    INDICATION: Left optic nerve sheath larger than right on CT. MR recommended. Headache, vomiting, photophobia, vision changes.  COMPARISON: 07/08/2023.  CONTRAST: 7 mL Gadavist.   TECHNIQUE:  1) Routine multiplanar multisequence head MRI without and with intravenous contrast.  2) Dedicated high-resolution multiplanar multisequence MRI of the orbits without and with intravenous contrast.    FINDINGS:  INTRACRANIAL CONTENTS: No acute or subacute infarct. No mass, acute hemorrhage, or extra-axial fluid collections. Normal brain parenchymal signal. Normal ventricles and sulci. Normal position of the cerebellar tonsils. No pathologic contrast enhancement.    SELLA: No abnormality  accounting for technique.    OSSEOUS STRUCTURES/SOFT TISSUES: Normal marrow signal. The major intracranial vascular flow voids are maintained.     ORBITS: Dedicated MRI of the orbits was performed. Asymmetric dilatation of the left optic nerve sheath with suggestion of peripheral enhancement (series 14 image 14). No definite enhancement or edema within the left optic nerve. Intact globes.   Symmetrical extraocular musculature without thickening/enlargement. The right intraorbital, canalicular and prechiasmatic optic nerve segments are normal in size and signal intensity bilaterally. The optic chiasm and proximal optic tracts are   unremarkable. No localized inflammation of the intraconal or extraconal orbital fat. Normal lacrimal glands. No intraorbital mass.     SINUSES/MASTOIDS: Focal mucosal thickening in the right ethmoid air cells. No middle ear or mastoid effusion.       Impression    IMPRESSION:  HEAD MRI:  1.  No acute intracranial process. Specifically, no acute intracranial hemorrhage, focal mass lesion, or acute infarct.    ORBIT MRI:  1.  Asymmetric dilatation of the left optic nerve sheath with suggestion of peripheral enhancement. No definite edema or enhancement of the left optic nerve. Findings may reflect optic perineuritis (from an infectious or inflammatory etiology) versus   unilateral pseudotumor/idiopathic intracranial hypertension.  2.  Unremarkable appearance of the right orbit. Specifically, no abnormal dilatation of the optic nerve sheath or abnormal signal within the right optic nerve.    Findings were discussed with Dr. Arvizu at 3:16 AM on 07/19/2023.   XR Lumbar Puncture Spinal Tap Diag    Narrative    FLUOROSCOPICALLY-GUIDED LUMBAR PUNCTURE  7/9/2023 8:56 PM CDT    INDICATION: 33-year-old patient with headache and blurred vision. Pseudotumor cerebri. Lumbar puncture has been requested to obtain cerebrospinal fluid (CSF) for analysis.    CONSENT: The procedure and its indications, major  risks, benefits, and alternatives were discussed. Risks include, but are not limited to, pain, headache, hemorrhage, infection, nerve damage, spinal cord damage, and allergic reaction. Understanding was   acknowledged, and informed consent was obtained.    FLUOROSCOPIC TIME: 0.2 minutes    AIR KERMA: 2 mGy    DAP: 13.1 microGym2     NUMBER OF IMAGES: 1    ESTIMATED BLOOD LOSS: Trace    PROCEDURE: The patient was placed in prone position upon the fluoroscopic table. The skin of the back was prepped and draped in sterile fashion.  Using fluoroscopic guidance, the L4-L5 level was localized. The skin was infiltrated with 1% lidocaine.   Using direct fluoroscopic visualization, a 22 gauge spinal needle was advanced into the thecal sac at the L4-L5 level. An opening pressure measurement of 33 cm H20 was obtained. 20 mL of clear CSF was passively obtained in total. This was divided between   four labeled tubes. Closing pressure was 17 cm H2O. The needle was then removed. A dressing was applied. The procedure appeared well-tolerated and was without apparent complication.      Impression    CONCLUSION:   1.  Fluoroscopically-guided lumbar puncture yields 20 mL of clear cerebrospinal fluid  2.  An opening pressure of 33 cm H2O  3.  Closing pressure of 17 cm H2O

## 2023-07-11 NOTE — CONSULTS
[facility]    Infectious Disease Consultation     Date of Admission:  7/8/2023  Date of Consult : 07/11/23    Assessment:  33 YF who has been admitted with headache, nausea and vomiting related to enterovirus associated aseptic meningitis. There is concern for let optic perineuritis vs unilateral pseudotumor/idiopathic intracranial hypertension . Aseptic meningitis is self limiting and no antimicrobial therapy is indicated. Further need for steroid therapy per Neurology recommendations.      Recommendations:  Aseptic meningitis is self limiting and no antimicrobial treatment is indicated  Neurology following and further recommendations for perineuritis per neurology. No contra indication if steroids are needed.  No additional recommendations from the ID stand point, ID will sign off.      Christine Leon MD    Reason for Consult   I was asked to evaluate this patient for evaluation of aseptic meningitis.    Primary Care Physician   Opal Gardner    Chief Complaint   Headache, nausea and vomiting    History is obtained from the patient and medical records    History of Present Illness   Regina Lawson is a 33 year old female who has been admitted with headache, nausea and vomiting and has been found to have aseptic meningitis related to enterovirus based on CSF evaluation    She developed acute onset headache on 7/7 with photophobia and pain with eye movement, she received sumatriptan for migraine headaches from her provider but headache was progressive. Her 8 month daughter has had a fever. Patient denies fever, had some chills, no neurological symptoms.    LP showed lymphocytic pleocytosis and positive meningitis panel for enterovirus. 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 MRI of the orbits was stable. ID has been asked to assist with antibiotic management.    She feels  much better, headache and nausea/vomiting have resolves, she notes some bilateral blurred vision    Past Medical History   I have reviewed this patient's medical history and updated it with pertinent information if needed.   Past Medical History:   Diagnosis Date    Acne     Contraception 2014    Nuva Ring    Irregular menstrual cycle 2007    Keratosis pilaris      arms    Meningitis, unspecified(322.9) age 4 months    hosp. 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  Past Surgical History:   Procedure Laterality Date    NO HISTORY OF SURGERY         Prior to Admission Medications   Prior to Admission Medications   Prescriptions Last Dose Informant Patient Reported? Taking?   Prenatal Vit-Fe Fumarate-FA (PRENATAL MULTIVITAMIN W/IRON) 27-0.8 MG tablet 7/8/2023 Self Yes Yes   Sig: Take 1 tablet by mouth daily   SUMAtriptan (IMITREX) 50 MG tablet 7/8/2023 Self, Pharmacy Yes Yes   Sig: Take 50 mg by mouth at onset of headache for migraine   albuterol (PROAIR HFA/PROVENTIL HFA/VENTOLIN HFA) 108 (90 BASE) MCG/ACT Inhaler prn med Self Yes Yes   Sig: Inhale 1-2 puffs into the lungs every 6 hours as needed   loratadine (CLARITIN) 10 MG tablet 7/8/2023 Self Yes Yes   Sig: Take 1 tablet (10 mg) by mouth daily   ondansetron (ZOFRAN ODT) 4 MG ODT tab 7/8/2023 Self, Pharmacy Yes Yes   Sig: Take 4 mg by mouth every 8 hours as needed for nausea      Facility-Administered Medications: None     Allergies   No Known Allergies    Immunization History   Immunization History   Administered Date(s) Administered    COVID-19 Bivalent 12+ (Pfizer) 10/13/2022    DTAP (<7y) 12/09/1992    HIB (PRP-T) 1991, 1991, 12/09/1992    HPV 09/14/2007, 02/29/2008, 04/17/2009    HepB 08/09/2002, 09/12/2002, 08/18/2003     Historical DTP/aP 1991, 1991, 1991    Influenza Vaccine >6 months (Jorgeuria,Fluzone) 01/26/2018    MMR 12/09/1992, 04/30/1996    OPV, trivalent, live 1991, 1991, 12/09/1992    TD,PF 7+ (Tenivac) 08/18/2003    TDAP Vaccine (Adacel) 02/19/2014       Social History   I have reviewed this patient's social history and updated it with pertinent information if needed. Regina Lawson  reports that she has never smoked. She has never used smokeless tobacco. She reports that she does not drink alcohol and does not use drugs.    Family History   I have reviewed this patient's family history and updated it with pertinent information if needed.   Family History   Problem Relation Age of Onset    Cancer - colorectal Paternal Grandfather     Prostate Cancer Paternal Grandfather     Allergies Sister     Allergies Sister     Allergies Mother     Gastrointestinal Disease Sister         ulcer    Gastrointestinal Disease Paternal Grandmother         polyps    Heart Disease Mother         heart M.    Lipids Mother     Osteoporosis Paternal Grandmother        Review of Systems   The 10 point Review of Systems is negative    Physical Exam   Temp: 98.5  F (36.9  C) Temp src: Oral BP: 119/71 Pulse: 61   Resp: 18 SpO2: 100 % O2 Device: None (Room air)    Vital Signs with Ranges  Temp:  [98  F (36.7  C)-98.6  F (37  C)] 98.5  F (36.9  C)  Pulse:  [55-61] 61  Resp:  [16-18] 18  BP: (110-122)/(60-72) 119/71  SpO2:  [95 %-100 %] 100 %  165 lbs 0 oz  Body mass index is 29.23 kg/m .    GENERAL APPEARANCE:  awake  EYES: Eyes grossly normal to inspection  NECK: no adenopathy  RESP: non labored breathing  ABDOMEN: soft, nontender  MS: extremities normal  SKIN: no suspicious lesions or rashes        Data   All laboratory data reviewed  Component      Latest Ref Rng 7/9/2023  8:10 PM   Tube Number 4    Color CSF      Colorless  Colorless    Appearance CSF      Clear  Clear    Total Nucleated Cells      0 - 5 /uL 124  (H)    RBC CSF      0 - 2 /uL 13 (H)    % Neutrophils CSF      % 1    % Lymphocytes CSF      % 53    % Mono/Macros CSF      % 46    Glucose CSF      40 - 70 mg/dL 56    Protein total CSF      15.0 - 45.0 mg/dL 57.3 (H)        Result Notes          Component Ref Range & Units 2 d ago    Escherichia coli K1 Negative Negative    Haemophilus influenzae Negative Negative    Listeria monocytogenes Negative Negative    Neisseria meningitidis Negative Negative    Streptococcus agalactiae (GBS) Negative Negative    Streptococcus pneumoniae Negative Negative    Cytomegalovirus Negative Negative    Enterovirus Negative Positive Abnormal     Herpes simplex virus 1 Negative Negative   Comment: Recommend testing with another molecular method if clinical suspicion for infection is high.    Herpes simplex virus 2 Negative Negative   Comment: Recommend testing with another molecular method if clinical suspicion for infection is high.    Human Herpes Virus 6 Negative Negative    Human parechovirus Negative Negative    Varicella zoster virus Negative Negative    Cryptococcus neoformans/gattii Negative Negative

## 2023-07-12 LAB
ACE CSF-CCNC: 2 U/L
ALB CSF/SERPL: 9.6 RATIO
ALBUMIN CSF-MCNC: 37 MG/DL
ALBUMIN SERPL-MCNC: 3853 MG/DL
IGG CSF-MCNC: 6.2 MG/DL
IGG SERPL-MCNC: 1176 MG/DL
IGG SYNTH RATE SER+CSF CALC-MRATE: 1.8 MG/D
IGG/ALB CLEAR SER+CSF-RTO: 0.55 RATIO
IGG/ALB CSF: 0.17 RATIO
OLIGOCLONAL BANDS CSF ELPH-IMP: ABNORMAL
OLIGOCLONAL BANDS CSF ELPH-IMP: NEGATIVE
OLIGOCLONAL BANDS CSF IEF: 0 BANDS
VDRL CSF QL: NON REACTIVE

## 2023-07-14 LAB
BACTERIA CSF CULT: NO GROWTH
GRAM STAIN RESULT: NORMAL
GRAM STAIN RESULT: NORMAL

## 2024-04-13 ENCOUNTER — HEALTH MAINTENANCE LETTER (OUTPATIENT)
Age: 34
End: 2024-04-13

## 2024-05-31 ENCOUNTER — NURSE TRIAGE (OUTPATIENT)
Dept: NURSING | Facility: CLINIC | Age: 34
End: 2024-05-31
Payer: COMMERCIAL

## 2024-05-31 NOTE — TELEPHONE ENCOUNTER
The reports feelinig ill x 2 days  Sever headache no rleifief with tylenol or ibuprofen  She rates the headache 8/10  She is concerned due to aseptic meninginitis that she contracted one year ago in July of 2023.  She denies any fever or nausea, but says she has a throbbing sensation behind her eyes.  Triage guidelines recommend to call back by pcp within 1 hour  Caller verbalized and understands directives    Reason for Disposition   SEVERE headache and not relieved by pain meds    Additional Information   Negative: Difficult to awaken or acting confused (e.g., disoriented, slurred speech)   Negative: Weakness of the face, arm or leg on one side of the body and new-onset   Negative: Numbness of the face, arm or leg on one side of the body and new-onset   Negative: Loss of speech or garbled speech and new-onset   Negative: Passed out (i.e., fainted, collapsed and was not responding)   Negative: Sounds like a life-threatening emergency to the triager   Negative: Followed a head injury within last 3 days   Negative: Traumatic Brain Injury (TBI) is suspected   Negative: Sinus pain of forehead and yellow or green nasal discharge   Negative: Pregnant   Negative: Unable to walk without falling   Negative: Stiff neck (can't touch chin to chest)   Negative: Possibility of carbon monoxide exposure   Negative: SEVERE headache, states 'worst headache' of life   Negative: SEVERE headache, sudden-onset (i.e., reaching maximum intensity within seconds to 1 hour)   Negative: Severe pain in one eye   Negative: Loss of vision or double vision  (Exception: Same as prior migraines.)   Negative: Patient sounds very sick or weak to the triager   Negative: Fever > 103 F (39.4 C)   Negative: Fever > 100.0 F (37.8 C) and has diabetes mellitus or a weak immune system (e.g., HIV positive, cancer chemotherapy, organ transplant, splenectomy, chronic steroids)   Negative: SEVERE headache (e.g., excruciating) and has had severe headaches  before    Protocols used: Headache-A-[place]

## 2024-06-02 ENCOUNTER — TELEPHONE (OUTPATIENT)
Dept: FAMILY MEDICINE | Facility: CLINIC | Age: 34
End: 2024-06-02
Payer: COMMERCIAL

## 2024-06-02 NOTE — TELEPHONE ENCOUNTER
Reason for Call:  Appointment Request    Patient requesting this type of appt:  Hospital/ED Follow-Up     Requested provider: Opal Gardner    Reason patient unable to be scheduled: Not within requested timeframe    When does patient want to be seen/preferred time: 3-7 days    Comments: Discharge date 06/02 - was seen at [facility]    Could we send this information to you in [facility] or would you prefer to receive a phone call?:   Patient would prefer a phone call   Okay to leave a detailed message?: Yes at Home number on file [phone] (home)    Call taken on 6/2/2024 at 12:25 PM by Minerva Starks

## 2025-04-19 ENCOUNTER — HEALTH MAINTENANCE LETTER (OUTPATIENT)
Age: 35
End: 2025-04-19

## (undated) RX ORDER — LIDOCAINE HYDROCHLORIDE 10 MG/ML
INJECTION, SOLUTION EPIDURAL; INFILTRATION; INTRACAUDAL; PERINEURAL
Status: DISPENSED
Start: 2023-07-09